# Patient Record
Sex: FEMALE | Race: OTHER | Employment: UNEMPLOYED | URBAN - METROPOLITAN AREA
[De-identification: names, ages, dates, MRNs, and addresses within clinical notes are randomized per-mention and may not be internally consistent; named-entity substitution may affect disease eponyms.]

---

## 2022-01-21 ENCOUNTER — OFFICE VISIT (OUTPATIENT)
Dept: FAMILY MEDICINE CLINIC | Age: 37
End: 2022-01-21

## 2022-01-21 VITALS
HEIGHT: 60 IN | BODY MASS INDEX: 35.53 KG/M2 | WEIGHT: 181 LBS | SYSTOLIC BLOOD PRESSURE: 107 MMHG | TEMPERATURE: 98 F | DIASTOLIC BLOOD PRESSURE: 73 MMHG | HEART RATE: 85 BPM | OXYGEN SATURATION: 99 %

## 2022-01-21 DIAGNOSIS — N92.6 MISSED MENSES: Primary | ICD-10-CM

## 2022-01-21 LAB
ANTIBODY SCREEN, EXTERNAL: NEGATIVE
BILIRUB UR QL STRIP: NEGATIVE
CHLAMYDIA, EXTERNAL: NEGATIVE
GLUCOSE UR-MCNC: NEGATIVE MG/DL
HBSAG, EXTERNAL: NEGATIVE
HCG URINE, QL. (POC): POSITIVE
HIV, EXTERNAL: NEGATIVE
KETONES P FAST UR STRIP-MCNC: NEGATIVE MG/DL
N. GONORRHEA, EXTERNAL: NEGATIVE
PH UR STRIP: 7 [PH] (ref 4.6–8)
PROT UR QL STRIP: NEGATIVE
RPR, EXTERNAL: NONREACTIVE
RUBELLA, EXTERNAL: NORMAL
SP GR UR STRIP: 1.02 (ref 1–1.03)
TYPE, ABO & RH, EXTERNAL: NORMAL
UA UROBILINOGEN AMB POC: NORMAL (ref 0.2–1)
URINALYSIS CLARITY POC: CLEAR
URINALYSIS COLOR POC: YELLOW
URINE BLOOD POC: NEGATIVE
URINE LEUKOCYTES POC: NEGATIVE
URINE NITRITES POC: NEGATIVE
VALID INTERNAL CONTROL?: YES

## 2022-01-21 PROCEDURE — 81003 URINALYSIS AUTO W/O SCOPE: CPT | Performed by: STUDENT IN AN ORGANIZED HEALTH CARE EDUCATION/TRAINING PROGRAM

## 2022-01-21 PROCEDURE — 0500F INITIAL PRENATAL CARE VISIT: CPT | Performed by: STUDENT IN AN ORGANIZED HEALTH CARE EDUCATION/TRAINING PROGRAM

## 2022-01-21 PROCEDURE — 81025 URINE PREGNANCY TEST: CPT | Performed by: STUDENT IN AN ORGANIZED HEALTH CARE EDUCATION/TRAINING PROGRAM

## 2022-01-21 NOTE — PROGRESS NOTES
Subjective:   Cecelia Sheppard is a 39 y.o. female who is being seen today for evaluation of pregnancy. LMP was on 2022. She has not receive any prenatal care and is currently not taking any PNV. Patient endorses some occasional headaches, but denies any symptoms such as dysuria, abdominal pain, chest pain, n/v and lower ext swelling. Gyn:   She is sexually active with and is not using contraception. Allergies- reviewed:   Not on File      Medications- reviewed:   Current Outpatient Medications   Medication Sig    acetaminophen (TYLENOL PO) Take  by mouth. No current facility-administered medications for this visit. Past Medical History- reviewed:  History reviewed. No pertinent past medical history. Past Surgical History- reviewed:   History reviewed. No pertinent surgical history. Social History- reviewed:  Social History     Socioeconomic History    Marital status: UNKNOWN     Spouse name: Not on file    Number of children: Not on file    Years of education: Not on file    Highest education level: Not on file   Occupational History    Not on file   Tobacco Use    Smoking status: Never Smoker    Smokeless tobacco: Never Used   Substance and Sexual Activity    Alcohol use: Yes    Drug use: Not on file    Sexual activity: Not on file   Other Topics Concern    Not on file   Social History Narrative    Not on file     Social Determinants of Health     Financial Resource Strain:     Difficulty of Paying Living Expenses: Not on file   Food Insecurity:     Worried About Running Out of Food in the Last Year: Not on file    Davi of Food in the Last Year: Not on file   Transportation Needs:     Lack of Transportation (Medical): Not on file    Lack of Transportation (Non-Medical):  Not on file   Physical Activity:     Days of Exercise per Week: Not on file    Minutes of Exercise per Session: Not on file   Stress:     Feeling of Stress : Not on file   Social Connections:     Frequency of Communication with Friends and Family: Not on file    Frequency of Social Gatherings with Friends and Family: Not on file    Attends Protestant Services: Not on file    Active Member of Clubs or Organizations: Not on file    Attends Club or Organization Meetings: Not on file    Marital Status: Not on file   Intimate Partner Violence:     Fear of Current or Ex-Partner: Not on file    Emotionally Abused: Not on file    Physically Abused: Not on file    Sexually Abused: Not on file   Housing Stability:     Unable to Pay for Housing in the Last Year: Not on file    Number of [place] in the Last Year: Not on file    Unstable Housing in the Last Year: Not on file         Immunizations- reviewed: There is no immunization history on file for this patient.     ROS:  General: No fevers or chills  GI: No abdominal pain, nausea, vomiting  : No vaginal bleeding      Objective:     Visit Vitals  /73 (BP 1 Location: Right upper arm, BP Patient Position: Sitting)   Pulse 85   Temp 98 °F (36.7 °C) (Oral)   Ht 5' 0.24\" (1.53 m)   Wt 181 lb (82.1 kg)   LMP 08/28/2021   SpO2 99%   BMI 35.07 kg/m²       Physical Exam:  GENERAL APPEARANCE: alert, well appearing, in no apparent distress, obese  HEAD: normocephalic, atraumatic  LUNGS: clear to auscultation, no wheezes, rales or rhonchi, symmetric air entry  HEART: regular rate and rhythm, no murmurs  ABDOMEN: FHT present: 150, fundal height: 22 cm  BACK: no CVA tenderness    Labs:  Urine pregnancy test   Recent Results (from the past 12 hour(s))   AMB POC URINE PREGNANCY TEST, VISUAL COLOR COMPARISON    Collection Time: 01/21/22 10:26 AM   Result Value Ref Range    VALID INTERNAL CONTROL POC Yes     HCG urine, Ql. (POC) Positive Negative   AMB POC URINALYSIS DIP STICK AUTO W/O MICRO    Collection Time: 01/21/22 10:26 AM   Result Value Ref Range    Color (UA POC) Yellow     Clarity (UA POC) Clear     Glucose (UA POC) Negative Negative    Bilirubin (UA POC) Negative Negative    Ketones (UA POC) Negative Negative    Specific gravity (UA POC) 1.020 1.001 - 1.035    Blood (UA POC) Negative Negative    pH (UA POC) 7.0 4.6 - 8.0    Protein (UA POC) Negative Negative    Urobilinogen (UA POC) 1 mg/dL 0.2 - 1    Nitrites (UA POC) Negative Negative    Leukocyte esterase (UA POC) Negative Negative         Assessment:     39year old  who presents for missed menses. Pregnancy at 20 and 5/7 weeks by LMP. SHIRLEY: 22. ICD-10-CM ICD-9-CM    1. Missed menses  N92.6 626.4 AMB POC URINE PREGNANCY TEST, VISUAL COLOR COMPARISON      AMB POC URINALYSIS DIP STICK AUTO W/O MICRO      CBC WITH AUTOMATED DIFF      HIV 1/2 AG/AB, 4TH GENERATION,W RFLX CONFIRM      TYPE & SCREEN      CULTURE, URINE      CHLAMYDIA/GC PCR      RUBELLA AB, IGG      HEMOGLOBIN FRACTIONATION      RPR      VZV AB, IGG      HEP B SURFACE AG      HEPATITIS C AB       Plan:   · Return to clinic in 7 days (21) for follow up on IOB. · Discussed will obtain PAP smear and 1 hr GTT  · Recommended Prenatal vitamins. · MFM scan schedule. · Will obtain IOB today, while patient is here. · Discussed avoiding medications other than Tylenol for pain. Recommended COVID and Flu vaccines. Recommended regular low-impact exercise and to watch weight. Recommended seat belt use. IUP: @ 20 wks5d per LMP  - IOB labs  - Will discuss genetic testing on next visit. - Needs pelvic exam and PAP smear    Late to care: At 20 weeks  - Encourage regular follow up. Will see in one week.   - Continue with PNV    Advance maternal age: Denies   - Consider starting ASA     Obesity:  - Discussed weight.  - Needs early 1 hr GTT      Orders Placed This Encounter    CULTURE, URINE     Standing Status:   Future     Standing Expiration Date:   2023    CHLAMYDIA/GC PCR     Standing Status:   Future     Standing Expiration Date:   2023     Order Specific Question:   Sample source     Answer: Urine [258]     Order Specific Question:   Specimen source     Answer:   Urine [258]    CBC WITH AUTOMATED DIFF     Standing Status:   Future     Standing Expiration Date:   1/22/2023    HIV 1/2 AG/AB, 4TH GENERATION,W RFLX CONFIRM     Standing Status:   Future     Standing Expiration Date:   1/21/2023    RUBELLA AB, IGG     Standing Status:   Future     Standing Expiration Date:   1/22/2023    HEMOGLOBIN FRACTIONATION     Standing Status:   Future     Standing Expiration Date:   1/21/2023    RPR     Standing Status:   Future     Standing Expiration Date:   1/22/2023    VZV AB, IGG     Standing Status:   Future     Standing Expiration Date:   1/22/2023    HEP B SURFACE AG     Standing Status:   Future     Standing Expiration Date:   1/21/2023    HEPATITIS C AB     Standing Status:   Future     Standing Expiration Date:   1/21/2023    AMB POC URINE PREGNANCY TEST, VISUAL COLOR COMPARISON    AMB POC URINALYSIS DIP STICK AUTO W/O MICRO    TYPE & SCREEN     ENTER SURGERY DATE IF FOR PRE-OP TESTING. Standing Status:   Future     Standing Expiration Date:   1/21/2023     Order Specific Question:   Has patient been transfused or pregnant in the last 3 mos. ? Answer:   Unknown    acetaminophen (TYLENOL PO)     Sig: Take  by mouth. I have discussed the diagnosis with the patient and the intended plan as seen in the above orders. The patient verbalized understanding of the treatment plan and agrees with the plan. The patient has received an after-visit summary and questions were answered concerning future plans. I have discussed medication side effects and warnings with the patient as well. Informed pt to return to the office or go to the ER if she experiences vaginal bleeding, vaginal discharge, leaking of fluid, pelvic cramping.       Lennie Palacios MD  Family Medicine Resident

## 2022-01-21 NOTE — PATIENT INSTRUCTIONS
[facility] consultas prenatales  Learning About Prenatal Visits  Instrucciones de cuidado     Las consultas prenatales regulares son muy importantes perry cualquier [facility]. Estas breves visitas al [employer] podrían parecer simples y rutinarias. Alanna pueden ayudarles a usted y mtz bebé a mantenerse sanos. Mtz médico está alerta a problemas que solo se pueden detectar si se los Lynsey Primus a usted y a mtz bebé en forma regular. Estas consultas [employer] dan a usted y a mtz médico tiempo para establecer hakeem buena relación. Muchas mujeres acuden a consultas prenatales con hakeem frecuencia de 4 a 6 semanas hasta la semana 28 del embarazo. [facility] consultas se [employer]. Con frecuencia, esto ocurre cada 2 o 3 semanas hasta la semana 36 del [facility]. Perry el último mes del [facility], es probable que acuda a abby a mtz médico cada semana. Podría tener un programa distinto si tiene un problema médico o es adolescente. En distintos momentos del embarazo, se le harán exámenes y Vendor. Algunos son [employer] [place]. Otros se hacen solamente cuando existen riesgos de que ocurra un problema. Todas las cosas que elen por mtz juan antonio Delcia Dauer a mtz bebé en desarrollo. Descanse cuando lo necesite. Aliméntese loretta, elsa abundante agua y elen ejercicio de Lizette regular. La atención de seguimiento es hakeem parte clave de mtz tratamiento y seguridad. Asegúrese de hacer y acudir a todas las citas, y llame a mtz médico si está teniendo problemas. También es hakeem buena idea saber los resultados de faith exámenes y mantener hakeem lista de los medicamentos que case. ¿Qué ocurre perry hakeem consulta prenatal?  · Le medirán la presión arterial y le harán también análisis de [place]. Es posible que [employer] billy análisis de Daniel stratton. Si necesita ir al baño mientras espera al médico, avísele a la enfermera. Marella Mt un recipiente para muestras con el fin de poder examinar mtz orina. · La pesarán y le medirán el abdomen.   · Mtz médico podría escuchar los latidos del corazón de mtz bebé con un estetoscopio especial.  · Perry el rayne trimestre, el médico le revisará el azúcar en la eliane (prueba de tolerancia a la glucosa) para detectar diabetes que puede aparecer perry el [place]. Esta diabetes se conoce rory diabetes gestacional y puede hacer daño al bebé. · Se le harán pruebas para detectar infecciones que podrían hacer daño a mtz bebé. Estas incluyen el estreptococo de huan B y hepatitis B.  · Mtz médico podría hacerle ecografías para detectar cualquier problema. Así también se comprobará la posición de mtz bebé. Culp [employer]ye [place] ondas de kieran para producir hakeem imagen de mtz bebé. · Es posible que le billy otras pruebas en cualquier momento del [place]. · Aproveche las consultas para conversar con mtz médico acerca de cualquier inquietud que tenga. ¿Cómo puede cuidarse en el hogar? · Descanse lo suficiente. · [employer] días, si mtz médico lo [place]. Si no ha hecho ejercicio en el pasado, comience poco a poco. Rocio muchas caminatas cortas todos los días. · Siga hakeem Aldona Shaan. Asegúrese de incluir en mtz dieta abundantes frijoles (habichuelas), arvejas (chícharos) y verduras de hojas verdes. · Rosana mucho líquido. Limite las bebidas con cafeína, rory el café, el té y las bebidas de cola. Si tiene hakeem enfermedad renal, cardíaca o hepática y tiene que restringir los líquidos, hable con mtz médico antes de aumentar la cantidad de líquido que makayla. · Evite las [employer], los vapores de pintura y sustancias tóxicas. No consuma alcohol, marihuana ni drogas ilegales. No fume, use cigarrillos electrónicos ni consuma tabaco. Si necesita ayuda para dejar de fumar, hable con mtz médico sobre programas y medicamentos para dejar de fumar. Estos pueden aumentar faith probabilidades de dejar de fumar para siempre.   · Revise todos los medicamentos que esté tomando con mtz Vijay Spray sea necesario cambiar algunos de faith medicamentos de rutina para proteger al bebé. No deje de agustin ni comience a agustin ningún medicamento sin hablar antes con mtz médico.  ¿Dónde puede encontrar más información en inglés? Tanja Wilson a http://www.gray.Ganos/  Juhi K815 en la búsqueda para aprender más acerca de \"Aprenda sobre las consultas prenatales. \"  Revisado: 16 junio, 2021               Versión del contenido: 13.0  © 1029-7007 Healthwise, Incorporated. Las instrucciones de cuidado fueron adaptadas bajo licencia por [employer] (which disclaims liability or warranty for this information). Si usted tiene Kenton Harned afección médica o sobre estas instrucciones, siempre pregunte a mtz profesional de juan antonio. Healthwise, Incorporated niega toda garantía o responsabilidad por mtz uso de esta información.

## 2022-01-21 NOTE — PROGRESS NOTES
Aleah Smith is a 39 y.o. female    Chief Complaint   Patient presents with   [address]     [facility]. She states thatshe is pregnant. LMP was 08/29/2021. She is 20 weeks and 5 days. G6, P5. She is not having vaginal bleeding. She state that she started about 2 weeks ago with tranpsarent discharge and itchiness. She is having fetal movement. She is not taking her prenatal vitamins. No contractions. No other concersn. 1. Have you been to the ER, urgent care clinic since your last visit? Hospitalized since your last visit? No  2. Have you seen or consulted any other health care providers outside of the [facility] since your last visit? Include any pap smears or colon screening. No      Visit Vitals  /73 (BP 1 Location: Right upper arm, BP Patient Position: Sitting)   Pulse 85   Temp 98 °F (36.7 °C) (Oral)   Ht 5' 0.24\" (1.53 m)   Wt 181 lb (82.1 kg)   SpO2 99%   BMI 35.07 kg/m²           Health Maintenance Due   Topic Date Due    Hepatitis C Screening  Never done    COVID-19 Vaccine (1) Never done    DTaP/Tdap/Td series (1 - Tdap) Never done    Cervical cancer screen  Never done    Flu Vaccine (1) Never done         Medication Reconciliation completed, changes noted.   Please  Update medication list.

## 2022-01-22 LAB
ABO + RH BLD: NORMAL
BACTERIA SPEC CULT: NORMAL
BLOOD BANK CMNT PATIENT-IMP: NORMAL
BLOOD GROUP ANTIBODIES SERPL: NORMAL
SERVICE CMNT-IMP: NORMAL
SPECIMEN EXP DATE BLD: NORMAL

## 2022-01-23 LAB
BASOPHILS # BLD: 0 K/UL (ref 0–0.1)
BASOPHILS NFR BLD: 0 % (ref 0–1)
DIFFERENTIAL METHOD BLD: NORMAL
EOSINOPHIL # BLD: 0.2 K/UL (ref 0–0.4)
EOSINOPHIL NFR BLD: 3 % (ref 0–7)
ERYTHROCYTE [DISTWIDTH] IN BLOOD BY AUTOMATED COUNT: 14.5 % (ref 11.5–14.5)
HBV SURFACE AG SER QL: <0.1 INDEX
HBV SURFACE AG SER QL: NEGATIVE
HCT VFR BLD AUTO: 36.8 % (ref 35–47)
HCV AB SERPL QL IA: NONREACTIVE
HGB BLD-MCNC: 11.5 G/DL (ref 11.5–16)
HIV 1+2 AB+HIV1 P24 AG SERPL QL IA: NONREACTIVE
HIV12 RESULT COMMENT, HHIVC: NORMAL
IMM GRANULOCYTES # BLD AUTO: 0 K/UL (ref 0–0.04)
IMM GRANULOCYTES NFR BLD AUTO: 0 % (ref 0–0.5)
LYMPHOCYTES # BLD: 2.7 K/UL (ref 0.8–3.5)
LYMPHOCYTES NFR BLD: 29 % (ref 12–49)
MCH RBC QN AUTO: 29.6 PG (ref 26–34)
MCHC RBC AUTO-ENTMCNC: 31.3 G/DL (ref 30–36.5)
MCV RBC AUTO: 94.6 FL (ref 80–99)
MONOCYTES # BLD: 0.6 K/UL (ref 0–1)
MONOCYTES NFR BLD: 6 % (ref 5–13)
NEUTS SEG # BLD: 5.9 K/UL (ref 1.8–8)
NEUTS SEG NFR BLD: 62 % (ref 32–75)
NRBC # BLD: 0 K/UL (ref 0–0.01)
NRBC BLD-RTO: 0 PER 100 WBC
PLATELET # BLD AUTO: 191 K/UL (ref 150–400)
PMV BLD AUTO: 12.9 FL (ref 8.9–12.9)
RBC # BLD AUTO: 3.89 M/UL (ref 3.8–5.2)
RPR SER QL: NONREACTIVE
RUBV IGG SER-IMP: REACTIVE
RUBV IGG SERPL IA-ACNC: 219.9 IU/ML
WBC # BLD AUTO: 9.5 K/UL (ref 3.6–11)

## 2022-01-24 LAB — VZV IGG SER IA-ACNC: 1057 INDEX

## 2022-01-25 LAB
C TRACH RRNA SPEC QL NAA+PROBE: NEGATIVE
HGB A MFR BLD: 97.5 % (ref 96.4–98.8)
HGB A2 MFR BLD COLUMN CHROM: 2.5 % (ref 1.8–3.2)
HGB F MFR BLD: 0 % (ref 0–2)
HGB FRACT BLD-IMP: NORMAL
HGB S MFR BLD: 0 %
N GONORRHOEA RRNA SPEC QL NAA+PROBE: NEGATIVE
SPECIMEN SOURCE: NORMAL

## 2022-01-28 NOTE — PROGRESS NOTES
Hgb: 11.5, Hgb fractionation normal, VZV immune, Hep C/Hep B/ HIV/ C/G neg, Rubella reactive, Ucx neg

## 2022-02-06 NOTE — PROGRESS NOTES
Initial OB Visit     3 [facility] interpretor used due to language barrier  Subjective:   Ayesha Bell is a 39 y.o. [id number]  At 58tnw4a with SHIRLEY of 22 based on LMP, who is being seen today for her first initial obstetrical visit. This is not a planned pregnancy. She is at 23w4d by LMP . History of Depression in pregnancy or post partum? No  History of GDM or DM? No  History of GHTN or HTN? No  History of pre-eclampsia? No  History of thyroid disorder? No, but her sister has a thyroid disorder (not sure what)   History of ? No  History of PTD? Yes, 36w  History of ? No  History of Genetic disorders? No  History of STD's? No  History of abnormal PAP? No  Taking prenatal vitamins? Yes    Allergies- reviewed:   No Known Allergies    Medications- reviewed:   Current Outpatient Medications   Medication Sig    PNV Comb #2-Iron-FA-Omega 3 29-1-400 mg cmpk Take  by mouth.  aspirin delayed-release 81 mg tablet Take 1 Tablet by mouth daily.  acetaminophen (TYLENOL PO) Take  by mouth. No current facility-administered medications for this visit. Past Medical History- reviewed:  No past medical history on file. Past Surgical History- reviewed:   No past surgical history on file. Social History- reviewed:  Social History     Socioeconomic History    Marital status: UNKNOWN     Spouse name: Not on file    Number of children: Not on file    Years of education: Not on file    Highest education level: Not on file   Occupational History    Not on file   Tobacco Use    Smoking status: Never Smoker    Smokeless tobacco: Never Used   Substance and Sexual Activity    Alcohol use:  Yes    Drug use: Not on file    Sexual activity: Not on file   Other Topics Concern    Not on file   Social History Narrative    Not on file     Social Determinants of Health     Financial Resource Strain:     Difficulty of Paying Living Expenses: Not on file   Food Insecurity:     Worried About Running Out of Food in the Last Year: Not on file    Ran Out of Food in the Last Year: Not on file   Transportation Needs:     Lack of Transportation (Medical): Not on file    Lack of Transportation (Non-Medical): Not on file   Physical Activity:     Days of Exercise per Week: Not on file    Minutes of Exercise per Session: Not on file   Stress:     Feeling of Stress : Not on file   Social Connections:     Frequency of Communication with Friends and Family: Not on file    Frequency of Social Gatherings with Friends and Family: Not on file    Attends Uatsdin Services: Not on file    Active Member of [address] [place] [address] [employer] or Organizations: Not on file    Attends Club or Organization Meetings: Not on file    Marital Status: Not on file   Intimate Partner Violence:     Fear of Current or Ex-Partner: Not on file    Emotionally Abused: Not on file    Physically Abused: Not on file    Sexually Abused: Not on file   Housing Stability:     Unable to Pay for Housing in the Last Year: Not on file    Number of [place] in the Last Year: Not on file    Unstable Housing in the Last Year: Not on file         Objective:     Visit Vitals  /66   Pulse 83   Temp 97.1 °F (36.2 °C) (Temporal)   Resp 17   Ht 5' 0.25\" (1.53 m)   Wt 182 lb (82.6 kg)   LMP 08/28/2021   SpO2 100%   BMI 35.25 kg/m²       See physical exam on flowsheet  Pelvic exam chaperoned by Mikey Heredia RN    Labs:    No results found for this or any previous visit (from the past 12 hour(s)). Assessment and Plan:         ICD-10-CM ICD-9-CM    1. Initial obstetric visit in second trimester  O09.32 V23.7 GLUCOSE, GESTATIONAL 1 HR TOLERANCE      HEMOGLOBIN A1C WITH EAG      PAP IG, APTIMA HPV AND RFX 16/18,45 ([id number])      TSH 3RD GENERATION      TSH 3RD GENERATION      HEMOGLOBIN A1C WITH EAG      GLUCOSE, GESTATIONAL 1 HR TOLERANCE      PAP IG, APTIMA HPV AND RFX 16/18,45 ([id number])   2. 23 weeks gestation of pregnancy  Z3A.23 V22.2    3. Obesity in pregnancy  O99.210 649.10 GLUCOSE, GESTATIONAL 1 HR TOLERANCE      HEMOGLOBIN A1C WITH EAG      HEMOGLOBIN A1C WITH EAG      GLUCOSE, GESTATIONAL 1 HR TOLERANCE   4. Multigravida of advanced maternal age in second trimester  O09.522 65.56        39 y.o. [id number]  at 15wfg5a with SHIRLEY 06/04/22, here for initial OB visit       IUP: @ 02suo8v  per LMP   - IOB labs collected last visit: B+, Ab neg, Hgb fractionation normal, HIV/G/C/RPR/Hep B/Hep C neg, Rub/VZV immune, Ucx neg  - US (2/7) @ 85pbr2h: Normal anatomy,  g 44th%, F/U suboptimal views in 4 weeks  - Pelvic exam and PAP smear today  - Continue taking prenatal vitamins daily  - Discussed optional genetic screening and educational material provided; pt unsure at this time      Late to care: At 20 weeks  - Encourage regular follow up. Next appt in 4 weeks (3/9/22 at 10:20am)  - Continue with PNV     Advance maternal age:   - Starting ASA 81mg today      Obesity in pregnancy: Body mass index is 35.25 kg/m². - Early 1 hr GTT today, hgb A1c  - Discussed recommended weight gain (prepreg BMI <19.8 28-40lb, 19.8-26 25-35lb, 26-29 15-25lb, >29 11-20lb)  - Recommend exercise in pregnancy at least 3 or more times weekly, mild to moderate intensity. Avoid activities that cause abdominal trauma. - Discussed appropriate diet during pregnancy, foods to avoid and stressed importance of hydration       · Follow up in 4 weeks    Orders Placed This Encounter    GLUCOSE, GESTATIONAL 1 HR TOLERANCE     Standing Status:   Future     Number of Occurrences:   1     Standing Expiration Date:   2/7/2023    HEMOGLOBIN A1C WITH EAG     Standing Status:   Future     Number of Occurrences:   1     Standing Expiration Date:   2/8/2023    TSH 3RD GENERATION     Standing Status:   Future     Number of Occurrences:   1     Standing Expiration Date:   2/9/2023    PNV Comb #2-Iron-FA-Omega 3 29-1-400 mg cmpk     Sig: Take  by mouth.     aspirin delayed-release 81 mg tablet     Sig: Take 1 Tablet by mouth daily. Dispense:  90 Tablet     Refill:  1    PAP IG, APTIMA HPV AND RFX 16/18,45 ([id number])     Standing Status:   Future     Number of Occurrences:   1     Standing Expiration Date:   2/8/2023     Order Specific Question:   Pap Source? Answer:   Cervical     Order Specific Question:   Total Hysterectomy? Answer:   No     Order Specific Question:   Supracervical Hysterectomy? Answer:   No     Order Specific Question:   Post Menopausal?     Answer:   No     Order Specific Question:   Hormone Therapy? Answer:   No     Order Specific Question:   IUD? Answer:   No     Order Specific Question:   Abnormal Bleeding? Answer:   No     Order Specific Question:   Pregnant     Answer:   Yes     Order Specific Question:   Post Partum? Answer:   No         I have discussed the diagnosis with the patient and the intended plan as seen in the above orders. The patient has received an after-visit summary and questions were answered concerning future plans. I have discussed medication side effects and warnings with the patient as well. Informed pt to return to the office or go to the ER if she experiences vaginal bleeding, vaginal discharge, leaking of fluid, pelvic cramping.       Pt seen and discussed with Kalin (attending physician)    David Stewart MD  Family Medicine Resident

## 2022-02-07 ENCOUNTER — HOSPITAL ENCOUNTER (OUTPATIENT)
Dept: PERINATAL CARE | Age: 37
Discharge: HOME | End: 2022-02-07
Attending: OBSTETRICS & GYNECOLOGY
Payer: MEDICAID

## 2022-02-07 PROCEDURE — 76811 OB US DETAILED SNGL FETUS: CPT | Performed by: OBSTETRICS & GYNECOLOGY

## 2022-02-09 ENCOUNTER — ROUTINE PRENATAL (OUTPATIENT)
Dept: FAMILY MEDICINE CLINIC | Age: 37
End: 2022-02-09
Payer: MEDICAID

## 2022-02-09 ENCOUNTER — HOSPITAL ENCOUNTER (OUTPATIENT)
Dept: LAB | Age: 37
Discharge: HOME | End: 2022-02-09
Payer: MEDICAID

## 2022-02-09 VITALS
HEIGHT: 60 IN | RESPIRATION RATE: 17 BRPM | DIASTOLIC BLOOD PRESSURE: 66 MMHG | WEIGHT: 182 LBS | TEMPERATURE: 97.1 F | OXYGEN SATURATION: 100 % | BODY MASS INDEX: 35.73 KG/M2 | HEART RATE: 83 BPM | SYSTOLIC BLOOD PRESSURE: 110 MMHG

## 2022-02-09 DIAGNOSIS — Z3A.23 23 WEEKS GESTATION OF PREGNANCY: ICD-10-CM

## 2022-02-09 DIAGNOSIS — O09.32 INITIAL OBSTETRIC VISIT IN SECOND TRIMESTER: Primary | ICD-10-CM

## 2022-02-09 DIAGNOSIS — O99.210 OBESITY IN PREGNANCY: ICD-10-CM

## 2022-02-09 DIAGNOSIS — O09.522 MULTIGRAVIDA OF ADVANCED MATERNAL AGE IN SECOND TRIMESTER: ICD-10-CM

## 2022-02-09 LAB
EST. AVERAGE GLUCOSE BLD GHB EST-MCNC: 114 MG/DL
GLUCOSE 1H P 100 G GLC PO SERPL-MCNC: 140 MG/DL (ref 65–140)
HBA1C MFR BLD: 5.6 % (ref 4–5.6)
TSH SERPL DL<=0.05 MIU/L-ACNC: 1.62 UIU/ML (ref 0.36–3.74)

## 2022-02-09 PROCEDURE — 88175 CYTOPATH C/V AUTO FLUID REDO: CPT

## 2022-02-09 PROCEDURE — 87624 HPV HI-RISK TYP POOLED RSLT: CPT

## 2022-02-09 PROCEDURE — 0500F INITIAL PRENATAL CARE VISIT: CPT | Performed by: STUDENT IN AN ORGANIZED HEALTH CARE EDUCATION/TRAINING PROGRAM

## 2022-02-09 RX ORDER — ASPIRIN 81 MG/1
81 TABLET ORAL DAILY
Qty: 90 TABLET | Refills: 1 | Status: SHIPPED | OUTPATIENT
Start: 2022-02-09

## 2022-02-09 NOTE — PROGRESS NOTES
I reviewed with the resident the medical history and the resident's findings on the physical examination. I discussed with the resident the patient's diagnosis and concur with the plan. [facility] @ 23w4d  1. IUP: RH pos, is undecided if she wants NIPT but info given   2. Late to care  3. Family hx thyroid disease: check TSH   4.   AMA+obesity: early GTT+A1C today, ASA

## 2022-02-09 NOTE — PROGRESS NOTES
Chief Complaint   Patient presents with    Initial Prenatal Visit     .  23w 4d today. No c/o.     1. Have you been to the ER, urgent care clinic since your last visit? Hospitalized since your last visit? No    2. Have you seen or consulted any other health care providers outside of the [address] [place] [address] since your last visit? Include any pap smears or colon screening.  No

## 2022-03-07 ENCOUNTER — HOSPITAL ENCOUNTER (OUTPATIENT)
Dept: PERINATAL CARE | Age: 37
Discharge: HOME | End: 2022-03-07
Attending: OBSTETRICS & GYNECOLOGY
Payer: MEDICAID

## 2022-03-07 PROCEDURE — 76816 OB US FOLLOW-UP PER FETUS: CPT | Performed by: OBSTETRICS & GYNECOLOGY

## 2022-03-09 ENCOUNTER — ROUTINE PRENATAL (OUTPATIENT)
Dept: FAMILY MEDICINE CLINIC | Age: 37
End: 2022-03-09
Payer: MEDICAID

## 2022-03-09 VITALS
HEART RATE: 86 BPM | TEMPERATURE: 97.5 F | DIASTOLIC BLOOD PRESSURE: 78 MMHG | WEIGHT: 183 LBS | OXYGEN SATURATION: 99 % | BODY MASS INDEX: 35.44 KG/M2 | RESPIRATION RATE: 16 BRPM | SYSTOLIC BLOOD PRESSURE: 111 MMHG

## 2022-03-09 DIAGNOSIS — Z3A.27 27 WEEKS GESTATION OF PREGNANCY: Primary | ICD-10-CM

## 2022-03-09 DIAGNOSIS — O09.522 MULTIGRAVIDA OF ADVANCED MATERNAL AGE IN SECOND TRIMESTER: ICD-10-CM

## 2022-03-09 DIAGNOSIS — O99.210 OBESITY IN PREGNANCY: ICD-10-CM

## 2022-03-09 PROCEDURE — 0502F SUBSEQUENT PRENATAL CARE: CPT | Performed by: STUDENT IN AN ORGANIZED HEALTH CARE EDUCATION/TRAINING PROGRAM

## 2022-03-09 PROCEDURE — 90686 IIV4 VACC NO PRSV 0.5 ML IM: CPT | Performed by: STUDENT IN AN ORGANIZED HEALTH CARE EDUCATION/TRAINING PROGRAM

## 2022-03-09 PROCEDURE — 90715 TDAP VACCINE 7 YRS/> IM: CPT | Performed by: STUDENT IN AN ORGANIZED HEALTH CARE EDUCATION/TRAINING PROGRAM

## 2022-03-09 NOTE — PROGRESS NOTES
I reviewed with the resident the medical history and the resident's findings on the physical examination. I discussed with the resident the patient's diagnosis and concur with the plan. 35yo  @ 27w4d  1. IUP: RH pos, NIPT at next appt with repeat GTT    2. Late to care  3. Family hx thyroid disease: check TSH   4.   AMA+obesity: early GTT+A1C today, ASA

## 2022-03-09 NOTE — PROGRESS NOTES
Tania Jenkins is a 39 y.o. female    Chief Complaint   Patient presents with    Routine Prenatal Visit     27w 4d       LOF: no  Vaginal bleeding: no  Fetal movement (after 20 weeks): yes  Contractions: yes - sometimes, not every day  Dysuria: no   Headaches, blurred vision, RUQ pain: no  Taking prenatal vitamins: yes    1. Have you been to the ER, urgent care clinic since your last visit? Hospitalized since your last visit? No    2. Have you seen or consulted any other health care providers outside of the [address] [place] [address] since your last visit? Include any pap smears or colon screening. No      Visit Vitals  /78   Pulse 86   Temp 97.5 °F (36.4 °C) (Temporal)   Resp 16   Wt 183 lb (83 kg)   SpO2 99%   BMI 35.44 kg/m²           Health Maintenance Due   Topic Date Due    Depression Screen  Never done    COVID-19 Vaccine (1) Never done         Medication Reconciliation completed, changes noted.   Please Update medication list.

## 2022-03-09 NOTE — PROGRESS NOTES
Return OB Visit       Subjective:   Jamila Gonzalez 39 y.o. Amador Lamb   SHIRLEY: 2022, by Last Menstrual Period  GA:  27w4d. LOF: no  Vaginal bleeding: no  Fetal movement (after 20 weeks): yes  Contractions: no    Pt denies fever, chills, HA, vision disturbances, RUQ pain, chest pain, SOB, N/V/D, constipation, urinary problems, foul smelling vaginal discharge, LE Edema worse than usual.     OB History    Para Term  AB Living   6 5 4 1   5   SAB IAB Ectopic Molar Multiple Live Births             5      # Outcome Date GA Lbr Osvaldo/2nd Weight Sex Delivery Anes PTL Lv   6 Current            5 Term  38w0d  6 lb 8 oz (2.948 kg) M  None N CHERELLE   4 Term  38w0d   F Vag-Spont None N CHERELLE   3 Term  38w0d  6 lb (2.722 kg) F Vag-Spont None N CHERELLE   2 Term  38w0d  8 lb (3.629 kg) M Vag-Spont None N CHERELLE   1   36w0d  6 lb 8.1 oz (2.95 kg) M Vag-Spont None Y CHERELLE       Allergies- reviewed:   No Known Allergies  Medications- reviewed:   Current Outpatient Medications   Medication Sig    PNV Comb #2-Iron-FA-Omega 3 29-1-400 mg cmpk Take  by mouth.  aspirin delayed-release 81 mg tablet Take 1 Tablet by mouth daily.  acetaminophen (TYLENOL PO) Take  by mouth. No current facility-administered medications for this visit. Past Medical History- reviewed:  No past medical history on file. Past Surgical History- reviewed:   No past surgical history on file. Social History- reviewed:  Social History     Socioeconomic History    Marital status: UNKNOWN     Spouse name: Not on file    Number of children: Not on file    Years of education: Not on file    Highest education level: Not on file   Occupational History    Not on file   Tobacco Use    Smoking status: Never Smoker    Smokeless tobacco: Never Used   Substance and Sexual Activity    Alcohol use:  Yes    Drug use: Not on file    Sexual activity: Not on file   Other Topics Concern    Not on file   Social History Narrative    Not on file     Social Determinants of Health     Financial Resource Strain:     Difficulty of Paying Living Expenses: Not on file   Food Insecurity:     Worried About Running Out of Food in the Last Year: Not on file    Davi of Food in the Last Year: Not on file   Transportation Needs:     Lack of Transportation (Medical): Not on file    Lack of Transportation (Non-Medical): Not on file   Physical Activity:     Days of Exercise per Week: Not on file    Minutes of Exercise per Session: Not on file   Stress:     Feeling of Stress : Not on file   Social Connections:     Frequency of Communication with Friends and Family: Not on file    Frequency of Social Gatherings with Friends and Family: Not on file    Attends Bahai Services: Not on file    Active Member of [address] [place] [address] [employer] or Organizations: Not on file    Attends Club or Organization Meetings: Not on file    Marital Status: Not on file   Intimate Partner Violence:     Fear of Current or Ex-Partner: Not on file    Emotionally Abused: Not on file    Physically Abused: Not on file    Sexually Abused: Not on file   Housing Stability:     Unable to Pay for Housing in the Last Year: Not on file    Number of [place] in the Last Year: Not on file    Unstable Housing in the Last Year: Not on file     Immunizations- reviewed: There is no immunization history for the selected administration types on file for this patient. Flu vaccine Today  Tdap Today    Objective:     Visit Vitals  /78   Pulse 86   Temp 97.5 °F (36.4 °C) (Temporal)   Resp 16   Wt 183 lb (83 kg)   LMP 08/28/2021   SpO2 99%   BMI 35.44 kg/m²     Physical Exam:  GENERAL APPEARANCE: alert, well appearing, in no apparent distress  ABDOMEN: gravid, Fundal height 27 FHT present at 145 bpm  PSYCH: normal mood and affect     Labs  No results found for this or any previous visit (from the past 12 hour(s)).     Assessment       ICD-10-CM ICD-9-CM    1. 27 weeks gestation of pregnancy Z3A.27 V22.2 INFLUENZA VIRUS VAC QUAD,SPLIT,PRESV FREE SYRINGE IM      TETANUS, DIPHTHERIA TOXOIDS AND ACELLULAR PERTUSSIS VACCINE (TDAP), IN INDIVIDS. >=7, IM      PA IMMUNIZ ADMIN,1 SINGLE/COMB VAC/TOXOID      PA IMMUNIZ,ADMIN,EACH ADDL   2. Multigravida of advanced maternal age in second trimester  O09.522 65.56    3. Obesity in pregnancy  O99.210 649.10        Plan   39 y.o.  27w4d SHIRLEY 2022, by Last Menstrual Period here for return OB visit        IUP: @ 06mdr1w  per LMP   - IOB labs collected last visit: B+, Ab neg, Hgb fractionation normal, HIV/G/C/RPR/Hep B/Hep C neg, Rub/VZV immune, Ucx neg, Pap NILM  - Flu and Tdap today  - U/S () @ 80hlv9x: Normal anatomy,  g 44th%,   - U/S (3/7) @ 65vhq8c: Normal anatomy, EFW 999g 47th%, suboptimal anatomy from last time well visualized  - Continue taking prenatal vitamins daily  - Discussed optional genetic screening: will draw at next visit with CBC and 1hr GTT     Late to care:  At 20 weeks  - Encourage regular follow up. Next appt in 4 weeks  - Continue with PNV     Advance maternal age:   - Continue ASA81     Obesity in pregnancy: Body mass index is 35.25 kg/m². - Early 1 hr GTT wnl, early hgb A1c 5.6, repeat testing at 24-28wk  - 1hr GTT at next visit, obtain third tri CBC at that time as well  - Discussed recommended weight gain (prepreg BMI <19.8 28-40lb, 19.8-26 25-35lb, 26-29 15-25lb, >29 11-20lb)  - Recommend exercise in pregnancy at least 3 or more times weekly, mild to moderate intensity. Avoid activities that cause abdominal trauma. - Discussed appropriate diet during pregnancy, foods to avoid and stressed importance of hydration     PREGNANCY CONCERNS    BIRTH PLAN  · Continuity Provider: none  · Pain mgmt. in labor: TBD  · Feeding plan: TBD  · Circ if male: TBD  · Social: Denies Tobacco, EtoH or illict drugs.       Orders Placed This Encounter    PA IMMUNIZ ADMIN,1 SINGLE/COMB VAC/TOXOID    [place] [address] [address] Tessie Zaldivar INFLUENZA VIRUS VAC QUAD,SPLIT,PRESV FREE SYRINGE IM (Flulaval, Fluzone, Fluarix) ([id number])    TETANUS, DIPHTHERIA TOXOIDS AND ACELLULAR PERTUSSIS VACCINE (TDAP), IN INDIVIDS. >=7, IM     Labor precautions discussed, including: Regular painful contractions, lasting for greater than one hour, taking your breath away; any vaginal bleeding; any leakage of fluid; or absent or decreased fetal movement. Call M.D. on call if any of these symptoms or signs occur. I have discussed the diagnosis with the patient and the intended plan as seen in the above orders. The patient has received an after-visit summary and questions were answered concerning future plans. I have discussed medication side effects and warnings with the patient as well. Informed pt to return to the office or go to the ER if she experiences vaginal bleeding, vaginal discharge, leaking of fluid, pelvic cramping.     Pt seen and discussed with Dr. Neto Mckeon (attending physician)    Samantha Lemus MD  Family Medicine Resident

## 2022-03-23 ENCOUNTER — ROUTINE PRENATAL (OUTPATIENT)
Dept: FAMILY MEDICINE CLINIC | Age: 37
End: 2022-03-23
Payer: MEDICAID

## 2022-03-23 VITALS
OXYGEN SATURATION: 99 % | BODY MASS INDEX: 35.89 KG/M2 | TEMPERATURE: 98.2 F | SYSTOLIC BLOOD PRESSURE: 107 MMHG | HEIGHT: 60 IN | DIASTOLIC BLOOD PRESSURE: 76 MMHG | WEIGHT: 182.8 LBS | RESPIRATION RATE: 16 BRPM | HEART RATE: 92 BPM

## 2022-03-23 DIAGNOSIS — R73.09 ABNORMAL GLUCOSE TOLERANCE TEST (GTT): ICD-10-CM

## 2022-03-23 DIAGNOSIS — Z34.93 PRENATAL CARE IN THIRD TRIMESTER: Primary | ICD-10-CM

## 2022-03-23 PROBLEM — Z83.49 FAMILY HISTORY OF THYROID DISEASE: Status: ACTIVE | Noted: 2022-03-23

## 2022-03-23 PROBLEM — Z34.80 SUPERVISION OF OTHER NORMAL PREGNANCY: Status: ACTIVE | Noted: 2022-03-23

## 2022-03-23 PROCEDURE — 0502F SUBSEQUENT PRENATAL CARE: CPT | Performed by: STUDENT IN AN ORGANIZED HEALTH CARE EDUCATION/TRAINING PROGRAM

## 2022-03-23 NOTE — PROGRESS NOTES
I reviewed with the resident the medical history and the resident's findings on the physical examination. I discussed with the resident the patient's diagnosis and concur with the plan. 44yo  at 29w4d here for return OB. 1. IUP: RH pos, NIPT today as well as GTT and CBC. Reviewed PNL U/S   2. Late to care  3. Failed early 1-hr GTT: repeat 1-hr GTT today, should have been 3-hr but not fasting   4. Family hx thyroid disease: TSH wnl  5.  AMA+obesity: continue daily ASA

## 2022-03-23 NOTE — PROGRESS NOTES
Return OB Visit     OB History    Para Term  AB Living   6 5 4 1   5   SAB IAB Ectopic Molar Multiple Live Births             5      # Outcome Date GA Lbr Osvaldo/2nd Weight Sex Delivery Anes PTL Lv   6 Current            5 Term  38w0d  6 lb 8 oz (2.948 kg) M  None N CHERELLE   4 Term  38w0d   F Vag-Spont None N CHERELLE   3 Term  38w0d  6 lb (2.722 kg) F Vag-Spont None N CHERELLE   2 Term  38w0d  8 lb (3.629 kg) M Vag-Spont None N CHERELLE   1   36w0d  6 lb 8.1 oz (2.95 kg) M Vag-Spont None Y CHERELLE   Stratus interpretor: [id number]  Subjective:   Kady Robles is a 39 y.o.  at 29w4d by LMP. Estimated Date of Delivery: 22. LOF: No  Vaginal bleeding: No  Fetal movement (after 20 weeks): Yes   Contractions: Irregular, infrequent  Urinary complaints?: Some urinary frequency, no dysuria. Headaches, blurred vision, RUQ pain: No  Taking prenatal vitamins: Yes      Allergies   No Known Allergies  Medications:   Current Outpatient Medications   Medication Sig    PNV Comb #2-Iron-FA-Omega 3 29-1-400 mg cmpk Take  by mouth.  aspirin delayed-release 81 mg tablet Take 1 Tablet by mouth daily.  acetaminophen (TYLENOL PO) Take  by mouth. (Patient not taking: Reported on 3/23/2022)     No current facility-administered medications for this visit. Past Medical History:  No past medical history on file. Past Surgical History:   No past surgical history on file. Social History:  Social History     Tobacco Use    Smoking status: Never Smoker    Smokeless tobacco: Never Used   Substance Use Topics    Alcohol use: Yes    Drug use: Not on file     Immunizations:   Immunization History   Administered Date(s) Administered    Influenza Vaccine Brainient) PF (>6 Mo Flulaval, Fluarix, and >3 Yrs [address] [place] [address]) 2022    Tdap 2022     S/p Flu and Tdap.   Objective:   Vital Signs  Visit Vitals  /76 (BP 1 Location: Left upper arm, BP Patient Position: Sitting, BP Cuff Size: Large adult)   Pulse 92   Temp 98.2 °F (36.8 °C) (Temporal)   Resp 16   Ht 5' 0.25\" (1.53 m)   Wt 182 lb 12.8 oz (82.9 kg)   LMP 2021   SpO2 99%   BMI 35.41 kg/m²       Physical Exam  See Prenatal Flowsheet, chaperoned by Carmen Brown, Licensed Nurse          Assessment   Adan Oliveira is a 39 y.o.  at 29w4d by LMP here for a return OB visit. Estimated Date of Delivery: 22. Plan     IUP: B+, Ab neg, Hgb fractionation normal, HIV/G/C/RPR/Hep B/Hep C neg, Rub/VZV immune, Ucx neg, Pap NILM.  - S/p Flu and Tdap  - U/S () @ 23w0d: Normal anatomy, MARA 174 g 44th%,   - U/S (3/7) @ 27w2d: Normal anatomy,  g 47th%, suboptimal anatomy from last time well visualized. - Continue taking prenatal vitamins daily  - A1c 5.6, 1 hr , repeat 1 hr GTT obtained (should have been 3 hr, but not fasting)  - Discussed optional genetic screening at prior visit: will draw today. - Obtain CBC    Urinary frequency:  - Will obtain UA w/ microscopic    Late to care:  Pt presented at 20 weeks. - Follow-up in 2 weeks  - Continue with PNV    Advance maternal age:   - Continue ASA 81 mg daily     Obesity in pregnancy:  - Early 1 hr GTT 140, A1c 5.6  - Recommend exercise in pregnancy at least 3 or more times weekly, mild to moderate intensity.  Avoid activities that cause abdominal trauma.    - Discussed appropriate diet during pregnancy, foods to avoid and stressed importance of hydration     Family hx of thyroid disease: TSH wnl. · Other  · Continuity Provider: None  · Pain mgmt. in labor: TBD  · Feeding: TBD  · Circ: TBD  · Social: TBD    Future Appointments   Date Time Provider Gurmeet Duque   2022  9:40 AM Linda Gagnon, DO [facility] BS AMB       Labor precautions discussed, including: Regular painful contractions, lasting for greater than one hour, taking your breath away; any vaginal bleeding; any leakage of fluid; or absent or decreased fetal movement.  Call M.D. on call if any of these symptoms or signs occur. I have discussed the diagnosis with the patient and the intended plan as seen in the above orders. The patient has received an after-visit summary and questions were answered concerning future plans. I have discussed medication side effects and warnings with the patient as well. Informed pt to return to the office or go to the ER if she experiences vaginal bleeding, vaginal discharge, leaking of fluid, pelvic cramping.     Patient discussed with Dr. Delta Arroyo (Attending Physician)    Justnia Briceño MD  Family Medicine Resident

## 2022-03-23 NOTE — PATIENT INSTRUCTIONS
Semanas 26 a 30 de day embarazo: Instrucciones de cuidado  Weeks 26 to 30 of Your Pregnancy: Care Instructions  Instrucciones de cuidado     Ahora usted se encuentra en el último trimestre del [place]. Day bebé está creciendo rápidamente. Y es probable que sienta que day bebé se mueve con más frecuencia. Es posible que day médico le diga que cuente la cantidad de patadas que da day bebé. La espalda puede dolerle mientras day cuerpo se acostumbra al tamaño y a la longitud de day bebé. Si todavía no le stein aplicado la vacuna Tdap (tétanos, difteria y tos [place]) perry fernando [place], hable con day médico acerca de aplicársela. Ghislaine Liam a proteger a day recién nacido contra la infección por tos ferina. Perry fernando tiempo, es importante que se cuide y preste atención a lo que day cuerpo necesita. Si tiene [employer], busque formas de estar con day arsalan que satisfagan day nivel de comodidad y deseo. Utilice las recomendaciones proporcionadas en esta hoja de cuidados para encontrar day propia manera de vivir la sexualidad. La atención de seguimiento es hakeem parte clave de day tratamiento y seguridad. Asegúrese de hacer y acudir a todas las citas, y llame a day médico si está teniendo problemas. También es hakeem buena idea saber los resultados de faith exámenes y mantener hakeem lista de los medicamentos que case. ¿Cómo puede cuidarse en el hogar? Leonard day trabajo con calma  · Tómese descansos frecuentes. Si es posible, deje de trabajar cuando esté cansada y descanse perry la hora del almuerzo. · Vaya al baño cada 2 horas. · Cambie de posición con frecuencia. Si está sentada perry mucho tiempo, póngase de pie y camine. · Si está toledo perry mucho tiempo, apoye un pie sobre un banco bajo, flexionando la rodilla. Después de estar toledo perry mucho tiempo, siéntese con los pies elevados.   · Evite las [place], las sustancias químicas y el humo del tabaco.  Viva day sexualidad a day manera  · [employer] sexuales perry el [employer], a menos que mtz médico le diga que no. · Podría tener un gran deseo sexual o estar completamente desinteresada. · El abdomen cada vez más perez podría hacer difícil encontrar hakeem buena posición perry el coito. Experimente y explore. · Cuando mtz arsalan le toque los senos, podría tener contracciones en [place]. · Un masaje en la espalda podría aliviar el dolor de espalda o los cólicos que a veces se sienten después del [place]. Aprenda sobre el trabajo de parto prematuro  · Esté alerta a las señales del trabajo de parto prematuro. Es posible que esté comenzando el [place] de parto si:  ? Tiene cólicos similares a los del período menstrual, con o sin náuseas. ? Tiene aproximadamente 4 contracciones o más en 20 minutos, o alrededor de 8 o más en 1 hora, incluso después de ed tomado un vaso de agua y estar en reposo. ? Tiene un dolor sordo (leve demetra continuo) en la riley baja de la espalda que no desaparece cuando cambia de posición. ? Siente dolor o presión en la pelvis que aparece y desaparece con determinada regularidad. ? Tiene espasmos intestinales o síntomas similares a los de la gripe, con o sin diarrea. ? Nota un aumento o un cambio en el flujo vaginal. El flujo podría ser Cathy Ovens, tener consistencia mucosa, ser Sheria Chute rastros de [place]. ? Se le rompe la mert. · Si ynes que ha comenzado un trabajo de parto prematuro:  ? Rosana 2 o 3 vasos de agua o jugo. No beber suficientes líquidos puede provocar contracciones. ? Detenga lo que esté haciendo, y vacíe la vejiga. Luego, acuéstese sobre el lado alden perry al menos 1 hora. ? Mientras descansa de jeanmarie, ubique mtz esternón. Coloque los dedos en el punto blando courtney debajo de él. Mueva los dedos hacia abajo en dirección al ombligo para encontrar la parte superior del [place]. Compruebe si está tenso. ? Las contracciones pueden ser débiles o intensas.  Registre faith contracciones perry Junita Likes hora. Petros Corydon contracción desde el comienzo de hakeem hasta el comienzo de [place]. ? Howard Dominga contracciones can que no ocurren con la regularidad de un patrón reciben el nombre de contracciones de [place]. Estas son \"contracciones de práctica\", demetra no indican el inicio del [place] de [place]. Por lo general, se detienen si cambia de [place]. ? Si tiene contracciones regulares, llame a mtz médico.  ¿Dónde puede encontrar más información en inglés? Mira Downey a http://www.machado.com/  Teresa Benedict P271 en la búsqueda para aprender más acerca de \"Semanas 26 a 30 de mtz embarazo: Instrucciones de cuidado. \"  Revisado: 16 junio, 2021               Versión del contenido: 13.2  © 1853-4735 Healthwise, Incorporated. Las instrucciones de cuidado fueron adaptadas bajo licencia por [employer] (which disclaims liability or warranty for this information). Si usted tiene [place] afección médica o sobre estas instrucciones, siempre pregunte a mtz profesional de juan antonio. [employer], [employer] niega toda garantía o responsabilidad por mtz uso de esta información.

## 2022-03-23 NOTE — PROGRESS NOTES
Adan Oliveira is a 39 y.o. female    Chief Complaint   Patient presents with    Routine Prenatal Visit     29w 4d today       LOF: no  Vaginal bleeding: no  Fetal movement (after 20 weeks): yes  Contractions: yes - randomly  Dysuria: no  Headaches, blurred vision, RUQ pain: no  Taking prenatal vitamins: yes    1. Have you been to the ER, urgent care clinic since your last visit? Hospitalized since your last visit? No    2. Have you seen or consulted any other health care providers outside of the [address] [place] [address] since your last visit? Include any pap smears or colon screening. No      Visit Vitals  /76 (BP 1 Location: Left upper arm, BP Patient Position: Sitting, BP Cuff Size: Large adult)   Pulse 92   Temp 98.2 °F (36.8 °C) (Temporal)   Resp 16   Ht 5' 0.25\" (1.53 m)   Wt 182 lb 12.8 oz (82.9 kg)   SpO2 99%   BMI 35.41 kg/m²           Health Maintenance Due   Topic Date Due    Depression Screen  Never done    COVID-19 Vaccine (1) Never done         Medication Reconciliation completed, changes noted.   Please Update medication list.

## 2022-03-24 ENCOUNTER — TELEPHONE (OUTPATIENT)
Dept: FAMILY MEDICINE CLINIC | Age: 37
End: 2022-03-24

## 2022-03-24 DIAGNOSIS — Z34.93 PRENATAL CARE IN THIRD TRIMESTER: Primary | ICD-10-CM

## 2022-03-24 DIAGNOSIS — O99.013 ANEMIA DURING PREGNANCY IN THIRD TRIMESTER: ICD-10-CM

## 2022-03-24 PROBLEM — Z83.49 FAMILY HISTORY OF THYROID DISEASE: Status: ACTIVE | Noted: 2022-03-23

## 2022-03-24 PROBLEM — Z34.80 SUPERVISION OF OTHER NORMAL PREGNANCY: Status: ACTIVE | Noted: 2022-03-23

## 2022-03-24 PROBLEM — R73.09 ABNORMAL GLUCOSE TOLERANCE TEST (GTT): Status: ACTIVE | Noted: 2022-03-23

## 2022-03-24 LAB
APPEARANCE UR: CLEAR
BILIRUB UR QL: NEGATIVE
COLOR UR: NORMAL
COMMENT, HOLDF: NORMAL
ERYTHROCYTE [DISTWIDTH] IN BLOOD BY AUTOMATED COUNT: 13.8 % (ref 11.5–14.5)
GLUCOSE 1H P 100 G GLC PO SERPL-MCNC: 139 MG/DL (ref 65–140)
GLUCOSE UR STRIP.AUTO-MCNC: NEGATIVE MG/DL
HCT VFR BLD AUTO: 33.7 % (ref 35–47)
HGB BLD-MCNC: 10.4 G/DL (ref 11.5–16)
HGB UR QL STRIP: NEGATIVE
KETONES UR QL STRIP.AUTO: NEGATIVE MG/DL
LEUKOCYTE ESTERASE UR QL STRIP.AUTO: NEGATIVE
MCH RBC QN AUTO: 28.9 PG (ref 26–34)
MCHC RBC AUTO-ENTMCNC: 30.9 G/DL (ref 30–36.5)
MCV RBC AUTO: 93.6 FL (ref 80–99)
NITRITE UR QL STRIP.AUTO: NEGATIVE
NRBC # BLD: 0 K/UL (ref 0–0.01)
NRBC BLD-RTO: 0 PER 100 WBC
PH UR STRIP: 6.5 [PH] (ref 5–8)
PLATELET # BLD AUTO: 204 K/UL (ref 150–400)
PMV BLD AUTO: 12.5 FL (ref 8.9–12.9)
PROT UR STRIP-MCNC: NEGATIVE MG/DL
RBC # BLD AUTO: 3.6 M/UL (ref 3.8–5.2)
SAMPLES BEING HELD,HOLD: NORMAL
SP GR UR REFRACTOMETRY: 1.02 (ref 1–1.03)
UROBILINOGEN UR QL STRIP.AUTO: 0.2 EU/DL (ref 0.2–1)
WBC # BLD AUTO: 9.9 K/UL (ref 3.6–11)

## 2022-03-24 RX ORDER — FERROUS SULFATE 325(65) MG
325 TABLET, DELAYED RELEASE (ENTERIC COATED) ORAL EVERY OTHER DAY
Qty: 90 TABLET | Refills: 1 | Status: SHIPPED | OUTPATIENT
Start: 2022-03-24

## 2022-03-24 NOTE — TELEPHONE ENCOUNTER
Micheline Interpretor [id number]. Called pt to discuss results and plan. Reviewed lab results. - UA wnl. Urine culture ordered. Pt understands to leave urine sample for culture in the next few days.  - Hgb 10.4, recommended iron supplement every other day. Rx sent to pharmacy. - 1 hr , ordered 3 hr GTT, staff messaged to schedule appointment. Pt instructed to call office to schedule appointment if she does not hear back by next week. Discussed w/ pt that she will need to come at 8 AM fasting when she has her lab appointment (to be scheduled).     Theodore Greco MD

## 2022-03-24 NOTE — PROGRESS NOTES
Reviewed lab results. - UA wnl, will order urine culture  - Hgb 10.4, will recommend iron supplement every other day  - 1 hr , will order 3 hr GTT, order placed, staff messaged to schedule appointment.

## 2022-03-29 ENCOUNTER — LAB ONLY (OUTPATIENT)
Dept: FAMILY MEDICINE CLINIC | Age: 37
End: 2022-03-29

## 2022-03-29 DIAGNOSIS — Z34.93 PRENATAL CARE IN THIRD TRIMESTER: ICD-10-CM

## 2022-03-30 LAB
GESTATIONAL 3HR GTT,GESTA: ABNORMAL
GLUCOSE 1H P 100 G GLC PO SERPL-MCNC: 178 MG/DL (ref 65–180)
GLUCOSE P FAST SERPL-MCNC: 99 MG/DL (ref 65–95)
GLUCOSE, 2 HR,GSTT2: 184 MG/DL (ref 65–155)
GLUCOSE, 3 HR,GSTT3: 164 MG/DL (ref 65–140)

## 2022-03-31 ENCOUNTER — TELEPHONE (OUTPATIENT)
Dept: FAMILY MEDICINE CLINIC | Age: 37
End: 2022-03-31

## 2022-03-31 DIAGNOSIS — O24.419 GESTATIONAL DIABETES MELLITUS (GDM) IN THIRD TRIMESTER, GESTATIONAL DIABETES METHOD OF CONTROL UNSPECIFIED: Primary | ICD-10-CM

## 2022-03-31 RX ORDER — BLOOD-GLUCOSE METER
EACH MISCELLANEOUS
Qty: 1 EACH | Refills: 0 | Status: SHIPPED | OUTPATIENT
Start: 2022-03-31

## 2022-03-31 RX ORDER — BLOOD SUGAR DIAGNOSTIC
STRIP MISCELLANEOUS
Qty: 100 STRIP | Refills: 1 | Status: SHIPPED | OUTPATIENT
Start: 2022-03-31 | End: 2022-05-02 | Stop reason: SDUPTHER

## 2022-03-31 RX ORDER — LANCETS
EACH MISCELLANEOUS
Qty: 1 EACH | Refills: 11 | Status: SHIPPED | OUTPATIENT
Start: 2022-03-31

## 2022-03-31 NOTE — PROGRESS NOTES
Failed 3 hr GTT. Called pt. Please see phone note. (Diet, fasting and 2 hr postprandial BG measurements, keep log). Educated on kick counts. Follow-up on 4/4/22.

## 2022-03-31 NOTE — TELEPHONE ENCOUNTER
Micheline interpretor [id number]:    Called pt. Failed 3 hr GTT. Discussed results with patient. - Counseled on diet. - Recommended pt to check BG fasting and 2 hours post-prandial. Goal <95 fasting, <120 two hr post-prandial. Counseled pt to keep a log of her readings. - Rx for BG monitoring machine, test strips, and lancets sent to pharmacy. - Pt counseled on potential adverse effects of GDM. Counseled on recording fetal movements. - Follow-up on 4/4/22 w/ Dr. Jacinto Combs at [facility]. Pt instructed to bring log of BG readings to appointment.
Ambulatory

## 2022-04-01 LAB
BACTERIA SPEC CULT: ABNORMAL
BACTERIA SPEC CULT: ABNORMAL
CC UR VC: ABNORMAL
SERVICE CMNT-IMP: ABNORMAL

## 2022-04-03 NOTE — PROGRESS NOTES
Return OB Visit       Subjective:   Lawrance Rather 39 y.o. [place]   SHIRLEY: 6/4/2022, by Last Menstrual Period  GA:  31w2d. LOF: no  Vaginal bleeding: no  Fetal movement (after 20 weeks): yes  Contractions: no      Allergies- reviewed:   No Known Allergies  Medications- reviewed:   Current Outpatient Medications   Medication Sig    lancets (ReliOn Ultra Thin Plus Lancets) misc Use as directed.  glucose blood VI test strips (ReliOn Prime Test Strips) strip Use as directed    Blood-Glucose Meter (ReliOn Prime Meter) misc Please measure your blood sugar at least four times daily (fasting and 2 hours after eating). Goal <95 fasting and <120 two hours after eating. Use as directed.  ferrous sulfate (IRON) 325 mg (65 mg iron) EC tablet Take 1 Tablet by mouth every other day.  PNV Comb #2-Iron-FA-Omega 3 29-1-400 mg cmpk Take  by mouth.  aspirin delayed-release 81 mg tablet Take 1 Tablet by mouth daily.  acetaminophen (TYLENOL PO) Take  by mouth. No current facility-administered medications for this visit. Past Medical History- reviewed:  No past medical history on file. Past Surgical History- reviewed:   No past surgical history on file. Social History- reviewed:  Social History     Socioeconomic History    Marital status: UNKNOWN     Spouse name: Not on file    Number of children: Not on file    Years of education: Not on file    Highest education level: Not on file   Occupational History    Not on file   Tobacco Use    Smoking status: Never Smoker    Smokeless tobacco: Never Used   Substance and Sexual Activity    Alcohol use:  Yes    Drug use: Not on file    Sexual activity: Not on file   Other Topics Concern    Not on file   Social History Narrative    Not on file     Social Determinants of Health     Financial Resource Strain:     Difficulty of Paying Living Expenses: Not on file   Food Insecurity:     Worried About [address] in the Last Year: Not on file    Ran Out of Food in the Last Year: Not on file   Transportation Needs:     Lack of Transportation (Medical): Not on file    Lack of Transportation (Non-Medical): Not on file   Physical Activity:     Days of Exercise per Week: Not on file    Minutes of Exercise per Session: Not on file   Stress:     Feeling of Stress : Not on file   Social Connections:     Frequency of Communication with Friends and Family: Not on file    Frequency of Social Gatherings with Friends and Family: Not on file    Attends Congregational Services: Not on file    Active Member of [address] [place] [address] [employer] or Organizations: Not on file    Attends Club or Organization Meetings: Not on file    Marital Status: Not on file   Intimate Partner Violence:     Fear of Current or Ex-Partner: Not on file    Emotionally Abused: Not on file    Physically Abused: Not on file    Sexually Abused: Not on file   Housing Stability:     Unable to Pay for Housing in the Last Year: Not on file    Number of [place] in the Last Year: Not on file    Unstable Housing in the Last Year: Not on file     Immunizations- reviewed:   Immunization History   Administered Date(s) Administered    Influenza Vaccine Retora Black) PF (>6 Mo Flulaval, Fluarix, and >3 Yrs Afluria, Fluzone 16983) 03/09/2022    Tdap 03/09/2022       Objective:     Visit Vitals  /69   Pulse (!) 104   Temp 97.3 °F (36.3 °C) (Temporal)   Resp 17   Ht 5' 0.25\" (1.53 m)   Wt 185 lb (83.9 kg)   LMP 08/28/2021   SpO2 96%   BMI 35.83 kg/m²       Physical Exam:  GENERAL APPEARANCE: alert, well appearing, in no apparent distress  ABDOMEN: gravid, fundal height 31 cm, FHT present at 150 bpm  PSYCH: normal mood and affect    Labs  No results found for this or any previous visit (from the past 12 hour(s)). Assessment         ICD-10-CM ICD-9-CM    1. 31 weeks gestation of pregnancy  Z3A.31 V22.2    2.  Gestational diabetes mellitus (GDM) in third trimester, gestational diabetes method of control unspecified  O24.419 648.83 REFERRAL TO DIABETIC EDUCATION   3. Anemia during pregnancy in third trimester  O99.013 648.23    4. Multigravida of advanced maternal age in second trimester  O09.522 65.56    5. Obesity in pregnancy  O99.210 649.10          Plan   39 y.o.  31w2d SHIRLEY 2022, by Last Menstrual Period here for return OB visit     IUP: B+, Ab neg, Hgb fractionation normal, HIV/G/C/RPR/Hep B/Hep C neg, Rub/VZV immune, Ucx neg, Pap NILM, Failed 1hr and 3hr GTT  -Panorama low risk  - S/p Flu and Tdap  - U/S () @ 23w0d: Normal anatomy, UUN 466 g 44th%,   - U/S (3/7) @ 27w2d: Normal anatomy,  g 47th%, suboptimal anatomy from last time well visualized. - Continue taking prenatal vitamins daily      Gestational Diabetes: Failed 3 hr GTT. A1C 5.6. Today values are mostly at goal. Some nighttime values elevated, discussed eating lower carb diet especially at dinner, cut out sodas and sweets. -Referral to diabetes education placed    Anemia: Hgb 10.4 on 3/23/22  -Continue oral iron     Urinary frequency: UCx 50k staph aureus on 3/23/22     Late to care:  Pt presented at 20 weeks. - Continue with PNV     Advance maternal age:   - Continue ASA 81 mg daily     Obesity in pregnancy:  - Recommend exercise in pregnancy at least 3 or more times weekly, mild to moderate intensity.  Avoid activities that cause abdominal trauma.    - Discussed appropriate diet during pregnancy, foods to avoid and stressed importance of hydration      Family hx of thyroid disease: TSH wnl.        · Other  ? Continuity Provider: None  ? Pain mgmt. in labor: TBD  ? Feeding: TBD  ? Circ: TBD  ?  Social: TBD        Orders Placed This Encounter    REFERRAL TO DIABETIC EDUCATION     Referral Priority:   Routine     Referral Type:   Consultation     Referral Reason:   Specialty Services Required     Requested Specialty:   Endocrinology     Number of Visits Requested:   1     Labor precautions discussed, including: Regular painful contractions, lasting for greater than one hour, taking your breath away; any vaginal bleeding; any leakage of fluid; or absent or decreased fetal movement. Call M.D. on call if any of these symptoms or signs occur. I have discussed the diagnosis with the patient and the intended plan as seen in the above orders. The patient has received an after-visit summary and questions were answered concerning future plans. I have discussed medication side effects and warnings with the patient as well. Informed pt to return to the office or go to the ER if she experiences vaginal bleeding, vaginal discharge, leaking of fluid, pelvic cramping.     Pt discussed with Dr. Wilfredo Duncan (attending physician)    Ady Recio DO  Family Medicine Resident

## 2022-04-04 ENCOUNTER — ROUTINE PRENATAL (OUTPATIENT)
Dept: FAMILY MEDICINE CLINIC | Age: 37
End: 2022-04-04
Payer: MEDICAID

## 2022-04-04 VITALS
RESPIRATION RATE: 17 BRPM | SYSTOLIC BLOOD PRESSURE: 109 MMHG | TEMPERATURE: 97.3 F | WEIGHT: 185 LBS | HEIGHT: 60 IN | BODY MASS INDEX: 36.32 KG/M2 | DIASTOLIC BLOOD PRESSURE: 69 MMHG | HEART RATE: 104 BPM | OXYGEN SATURATION: 96 %

## 2022-04-04 DIAGNOSIS — O24.419 GESTATIONAL DIABETES MELLITUS (GDM) IN THIRD TRIMESTER, GESTATIONAL DIABETES METHOD OF CONTROL UNSPECIFIED: ICD-10-CM

## 2022-04-04 DIAGNOSIS — Z3A.31 31 WEEKS GESTATION OF PREGNANCY: Primary | ICD-10-CM

## 2022-04-04 DIAGNOSIS — O99.210 OBESITY IN PREGNANCY: ICD-10-CM

## 2022-04-04 DIAGNOSIS — O99.013 ANEMIA DURING PREGNANCY IN THIRD TRIMESTER: ICD-10-CM

## 2022-04-04 DIAGNOSIS — O09.522 MULTIGRAVIDA OF ADVANCED MATERNAL AGE IN SECOND TRIMESTER: ICD-10-CM

## 2022-04-04 PROCEDURE — 0502F SUBSEQUENT PRENATAL CARE: CPT | Performed by: STUDENT IN AN ORGANIZED HEALTH CARE EDUCATION/TRAINING PROGRAM

## 2022-04-04 NOTE — PROGRESS NOTES
Chief Complaint   Patient presents with    Routine Prenatal Visit     .  31w 2d today. No c/o.     1. Have you been to the ER, urgent care clinic since your last visit? Hospitalized since your last visit? No    2. Have you seen or consulted any other health care providers outside of the [address] [place] [address] since your last visit? Include any pap smears or colon screening.  No

## 2022-04-04 NOTE — PATIENT INSTRUCTIONS
Semanas 30 a 32 de mtz embarazo: Instrucciones de cuidado  Weeks 30 to 32 of Your Pregnancy: Care Instructions  Instrucciones de cuidado     Ha llegado a los últimos meses de embarazo. A estas [place], mtz bebé en verdad comienza a tener la apariencia de un bebé, con juan carlos y piel rellenita. A medida que entra en las [facility]imas [facility]anas [facility] comenzará a caer en la realidad de que va a tener un bebé. Fernando es el momento de elegir un nombre, ordenar mtz casa, organizar un cuarto de niños seguro y buscar atención infantil de calidad, de ser necesaria. Hacer esto con anterioridad le permitirá concentrarse en cuidar y disfrutar de mtz bebé. También podría hacer hakeem visita a la marty de partos del hospital para [place] mejor idea sobre qué esperar mientras está en el hospital.  Perry estos últimos meses, es muy importante que se cuide y preste atención a lo que mtz cuerpo necesita. Si mtz médico le dice que está loretta que trabaje, no se exija demasiado. Siga las recomendaciones proporcionadas en esta hoja de cuidados para aliviar la acidez estomacal y [address] várices. Si todavía no le stein aplicado la vacuna Tdap (tétanos, difteria y tos [place]) perry fernando [facility], hable con mtz médico acerca de aplicársela. Leda Molt a proteger a mtz recién nacido contra la infección por tos ferina. La atención de seguimiento es hakeem parte clave de mtz tratamiento y seguridad. Asegúrese de hacer y acudir a todas las citas, y llame a mtz médico si está teniendo problemas. También es hakeem buena idea saber los resultados de faith exámenes y mantener hakeem lista de los medicamentos que case. ¿Cómo puede cuidarse en el hogar? Preste atención a los movimientos de mtz bebé  · Debería sentir que mtz bebé se mueve varias veces al día. · Mtz bebé ahora cambia menos de posición, y patea y da golpes con más frecuencia. · Mtz bebé duerme de 20 a 45 minutos cada [facility] y [address] en determinados momentos del día.   · Si el médico quiere que cuente las patadas del bebé:  ? Vacíe mtz vejiga y acuéstese de lado o recuéstese en hakeem silla cómoda. ? Anote la hora inicial.  ? Preste atención solo a los movimientos de mtz bebé. Cuente todos los movimientos, excepto los del hipo. ? Después de que haya contado 10 movimientos, anote la hora. ? Anote cuántos minutos le llevaron a mtz bebé los 10 movimientos. ? Si pasa hakeem hora y no ha registrado 10 movimientos, coma o elsa algo y [place] cuente perry otra hora. Llame a mtz médico si no registra al menos 10 movimientos en marita período de 2 horas. Alivie la acidez estomacal  · Coma comidas pequeñas y frecuentes. · No coma chocolate, menta ni comidas muy picantes. Evite las bebidas con cafeína, rory el café, el té y las sodas. · Evite doblarse hacia adelante o acostarse después de comer. · Rocio hakeem caminata corta después de comer. · Si tiene problemas de [place] estomacal perry la noche, no coma por 2 horas antes de acostarse. · [place] antiácidos, rory Mylanta, Maalox, Rolaids o Tums. No tome antiácidos que contengan bicarbonato de sodio. Cuide las várices  · Las várices son vasos sanguíneos que se dilatan debido a la mayor cantidad de eliane perry el embarazo. Puede tener dolor o palpitaciones en las piernas. La mayoría de las várices desaparecerán después del [place]. · Evite estar toledo perry mucho tiempo. Siéntese con las piernas cruzadas a la altura de los tobillos, no de las rodillas. · Siéntese con los pies elevados. · Evite usar ropa o medias ajustadas. Use medias de compresión. · Rocio ejercicio con regularidad. Trate de caminar por lo menos 30 minutos al día. ¿Dónde puede encontrar más información en inglés? Vaya a http://www.[employer]ix/  Juhi [id number] en la búsqueda para aprender más acerca de \"Semanas 30 a 28 de mtz embarazo: Instrucciones de cuidado. \"  Revisado: 16 junio, 2021               Versión del contenido: 13.2  © 6573-5021 Healthwise, Incorporated.    Carmen Denny instrucciones de cuidado fueron adaptadas bajo licencia por [facility] (which disclaims liability or warranty for this information). Si usted tiene [place] afección médica o sobre estas instrucciones, siempre pregunte a mtz profesional de juan antonio. [facility] niega toda garantía o responsabilidad por mtz uso de esta información.

## 2022-04-04 NOTE — PROGRESS NOTES
I reviewed with the resident the medical history and the resident's findings on the physical examination. I discussed with the resident the patient's diagnosis and concur with the plan. 44yo  at 31w2d by L/23 here for return OB. 1. IUP: RH pos, NIPT wnl, anatomy w/o anomalies, has gotten flu and tdap   2. Late to care: established at 23w4   3. A1GDM: Resident reviewed - Discussed risks of uncontrolled glucose including but not limited to macrosomia, birth injury, increased need for , birth defects, miscarriage/fetal demise,  hypoglycemia, persistent diabetes after pregnancy. First visit since dx, has been checking sugars and mostly at goal, education provided today, has close follow-up in 1 week to review log.   4. Family hx thyroid disease: TSH wnl  5.  AMA+obesity: continue daily ASA     [address] [facility], [address] [place] [address] Family Medicine  2022

## 2022-04-13 ENCOUNTER — HOSPITAL ENCOUNTER (OUTPATIENT)
Dept: PERINATAL CARE | Age: 37
Discharge: HOME | End: 2022-04-13
Attending: OBSTETRICS & GYNECOLOGY
Payer: MEDICAID

## 2022-04-13 PROCEDURE — 76816 OB US FOLLOW-UP PER FETUS: CPT | Performed by: OBSTETRICS & GYNECOLOGY

## 2022-04-22 ENCOUNTER — TELEPHONE (OUTPATIENT)
Dept: FAMILY MEDICINE CLINIC | Age: 37
End: 2022-04-22

## 2022-04-25 ENCOUNTER — TELEPHONE (OUTPATIENT)
Dept: FAMILY MEDICINE CLINIC | Age: 37
End: 2022-04-25

## 2022-04-25 NOTE — TELEPHONE ENCOUNTER
----- Message from [facility] sent at 4/22/2022 11:05 AM EDT -----  Subject: Message to Provider    QUESTIONS  Information for Provider? Patient called to schedule a prenatal   appointment with this pcp, give patient a call back to advise further. ---------------------------------------------------------------------------  --------------  [email or web address] INFO  What is the best way for the office to contact you? OK to leave message on   voicemail  Preferred Call Back Phone Number? [phone]  ---------------------------------------------------------------------------  --------------  SCRIPT ANSWERS  Relationship to Patient?  Self

## 2022-05-02 ENCOUNTER — ROUTINE PRENATAL (OUTPATIENT)
Dept: FAMILY MEDICINE CLINIC | Age: 37
End: 2022-05-02
Payer: MEDICAID

## 2022-05-02 VITALS
DIASTOLIC BLOOD PRESSURE: 69 MMHG | BODY MASS INDEX: 36.71 KG/M2 | HEIGHT: 60 IN | OXYGEN SATURATION: 98 % | RESPIRATION RATE: 17 BRPM | WEIGHT: 187 LBS | HEART RATE: 91 BPM | TEMPERATURE: 98.1 F | SYSTOLIC BLOOD PRESSURE: 105 MMHG

## 2022-05-02 DIAGNOSIS — Z3A.35 35 WEEKS GESTATION OF PREGNANCY: Primary | ICD-10-CM

## 2022-05-02 DIAGNOSIS — O24.410 DIET CONTROLLED GESTATIONAL DIABETES MELLITUS (GDM) IN THIRD TRIMESTER: ICD-10-CM

## 2022-05-02 PROCEDURE — 0502F SUBSEQUENT PRENATAL CARE: CPT | Performed by: STUDENT IN AN ORGANIZED HEALTH CARE EDUCATION/TRAINING PROGRAM

## 2022-05-02 RX ORDER — BLOOD SUGAR DIAGNOSTIC
STRIP MISCELLANEOUS
Qty: 100 STRIP | Refills: 1 | Status: SHIPPED | OUTPATIENT
Start: 2022-05-02

## 2022-05-02 NOTE — PATIENT INSTRUCTIONS
[place]: Instrucciones de cuidado  Nutrition During Pregnancy: Care Instructions  Instrucciones de cuidado     [place] en forma saludable perry el embarazo es importante para usted y mtz bebé. Puede ayudarla a sentirse loretta y tener un embarazo y parto exitosos. [place] el embarazo faith necesidades nutricionales aumentan. Aun si tiene excelentes hábitos alimentarios, mtz médico le podría recomendar un multivitamínico para asegurarse de que reciba suficiente welilngton y ácido fólico.  Muchas mujeres embarazadas se preguntan cuánto deben aumentar de peso. En general, a las mujeres que antes del [facility] tenían un peso saludable, se les recomienda aumentar entre 25 y 28 libras (entre 11 y 16 kg). A las mujeres que tenían sobrepeso antes del [facility], se les suele recomendar que aumenten de 15 a 25 libras (de 7 a 11 kg). A las mujeres cuyo peso estaba debajo del peso normal antes del [facility], se les suele recomendar que aumenten de 28 a 40 libras (de 13 a 18 kg). Colaborará con mtz médico para establecer un peso objetivo. Aumentar hakeem cantidad saludable de peso la ayudará a tener un bebé tomi. La atención de seguimiento es hakeem parte clave de mtz tratamiento y seguridad. Asegúrese de hacer y acudir a todas las citas, y llame a mtz médico si está teniendo problemas. También es hakeem buena idea saber los resultados de faith exámenes y mantener hakeem lista de los medicamentos que case. ¿Cómo puede cuidarse en el hogar? · Coma abundantes frutas y verduras. Incluya hakeem variedad de verduras de hoja heath oscuro, color naranja y [employer]. · Escoja panes, cereales y pastas de grano entero. Los panes y las pastas de annita entero, el arroz integral y la eileen son Junior Conch opciones. · Consuma 4 porciones o más de [place] y [facility]. [employer] o semidescremada, y el yogur y el queso sin grasa o con poca grasa son Junior Conch opciones.  Si no puede consumir lácteos, obtenga el calcio que mtz cuerpo necesita de productos enriquecidos con calcio, rory jugo de [place], leche de soya y tofu. Entre otras silva no lácteas de calcio se [employer] verduras de hojas verdes, rory el brócoli, la col rizada, las hojas de [place], las hojas de nabo, el bok [place] y las coles de [place]. · Si come carne, escoja aquellos tipos que tengan menos grasa. Luis Antonio Buckner opciones son los blevins [place] de carne y el anjum o el pavo sin piel. · No coma tiburón, pez tg, caballa marielena ni blanquillo. Tienen alto contenido de rodríguez, el cual es peligroso para mtz bebé. Puede comer hasta 12 onzas a la semana de pescado o de mariscos que tengan bajos niveles de rodríguez. Las buenas opciones incluyen camarón, salmón joyce, abadejo y [place]. Limite algunos otros tipos de pescado, rory el atún bonner (albacora) a 4 onzas (0.1 kg) a la semana. · [place] embutidos, rory el de [place], el jamón y la Oto, a 165°F (74°C) antes de comerlos. [place] disminuye el riesgo de enfermarse a causa de un tipo de bacteria que puede encontrarse en los embutidos. · No coma quesos blandos sin pasteurizar, rory queso \"brie\", feta, mozzarella fresca y [place]. Estas clases de quesos tienen hakeem bacteria que puede perjudicar al bebé. · Limite la cafeína. Si makayla café o té, no tome más de 1 taza al día. Las bebidas cola también contienen cafeína. · No elsa nada de alcohol. No se ha determinado que ninguna cantidad de alcohol sea hunt perry el embarazo. · No se ponga a dieta ni trate de bajar de peso. Por ejemplo, no siga hakeem dieta baja en carbohidratos. Si tiene sobrepeso al comienzo del [facility], mtz médico colaborará con usted para manejar el aumento de [place]. · Infórmele a mzt médico sobre todas las vitaminas y los suplementos que tome. ¿Cuándo debe pedir ayuda? Vigile muy de cerca los cambios en mtz juan antonio, y asegúrese de comunicarse con mtz médico si tiene algún problema.   ¿Dónde puede encontrar más información en joon? Timothy a http://www.gray.com/  Juhi S033 en la búsqueda para aprender más acerca de \"Nutrición perry el embarazo: Instrucciones de cuidado. \"  Revisado: 16 junio, 2021               Versión del contenido: 13.2  © 4520-0890 Healthwise, eegoes. Las instrucciones de cuidado fueron adaptadas bajo licencia por Good [employer] (which disclaims liability or warranty for this information). Si usted tiene [place] afección médica o sobre estas instrucciones, siempre pregunte a mtz profesional de juan antonio. [employer], eegoes niega toda garantía o responsabilidad por mtz uso de esta información.

## 2022-05-02 NOTE — PROGRESS NOTES
Chief Complaint   Patient presents with    Routine Prenatal Visit     Patient states she has abdominal pain rated at a 4. Patient states that she has discharge with some blood in it. It started yesterday. Visit Vitals  /89 (BP 1 Location: Right arm, BP Patient Position: Sitting, BP Cuff Size: Adult)   Pulse (!) 115   Temp 98.1 °F (36.7 °C) (Oral)   Resp 17   Ht 5' (1.524 m)   Wt 187 lb (84.8 kg)   SpO2 98%   BMI 36.52 kg/m²     1. Have you been to the ER, urgent care clinic since your last visit? Hospitalized since your last visit? No    2. Have you seen or consulted any other health care providers outside of the [address] [place] [address] since your last visit? Include any pap smears or colon screening.  No

## 2022-05-04 ENCOUNTER — HOSPITAL ENCOUNTER (INPATIENT)
Age: 37
LOS: 2 days | Discharge: HOME | DRG: 560 | End: 2022-05-06
Attending: FAMILY MEDICINE | Admitting: OBSTETRICS & GYNECOLOGY
Payer: MEDICAID

## 2022-05-04 PROBLEM — Z34.90 PREGNANCY: Status: ACTIVE | Noted: 2022-05-04

## 2022-05-04 LAB
ABO + RH BLD: NORMAL
ALBUMIN SERPL-MCNC: 2.5 G/DL (ref 3.5–5)
ALBUMIN/GLOB SERPL: 0.6 {RATIO} (ref 1.1–2.2)
ALP SERPL-CCNC: 122 U/L (ref 45–117)
ALT SERPL-CCNC: 16 U/L (ref 12–78)
ANION GAP SERPL CALC-SCNC: 9 MMOL/L (ref 5–15)
AST SERPL-CCNC: 22 U/L (ref 15–37)
BILIRUB SERPL-MCNC: <0.1 MG/DL (ref 0.2–1)
BLOOD GROUP ANTIBODIES SERPL: NORMAL
BUN SERPL-MCNC: 6 MG/DL (ref 6–20)
BUN/CREAT SERPL: 11 (ref 12–20)
CALCIUM SERPL-MCNC: 9 MG/DL (ref 8.5–10.1)
CHLORIDE SERPL-SCNC: 105 MMOL/L (ref 97–108)
CO2 SERPL-SCNC: 24 MMOL/L (ref 21–32)
CREAT SERPL-MCNC: 0.57 MG/DL (ref 0.55–1.02)
ERYTHROCYTE [DISTWIDTH] IN BLOOD BY AUTOMATED COUNT: 13.7 % (ref 11.5–14.5)
GLOBULIN SER CALC-MCNC: 4.1 G/DL (ref 2–4)
GLUCOSE SERPL-MCNC: 97 MG/DL (ref 65–100)
HCT VFR BLD AUTO: 31.5 % (ref 35–47)
HGB BLD-MCNC: 10.1 G/DL (ref 11.5–16)
MCH RBC QN AUTO: 27.4 PG (ref 26–34)
MCHC RBC AUTO-ENTMCNC: 32.1 G/DL (ref 30–36.5)
MCV RBC AUTO: 85.6 FL (ref 80–99)
NRBC # BLD: 0 K/UL (ref 0–0.01)
NRBC BLD-RTO: 0 PER 100 WBC
PLATELET # BLD AUTO: 233 K/UL (ref 150–400)
PMV BLD AUTO: 12.2 FL (ref 8.9–12.9)
POTASSIUM SERPL-SCNC: 4 MMOL/L (ref 3.5–5.1)
PROT SERPL-MCNC: 6.6 G/DL (ref 6.4–8.2)
RBC # BLD AUTO: 3.68 M/UL (ref 3.8–5.2)
SODIUM SERPL-SCNC: 138 MMOL/L (ref 136–145)
SPECIMEN EXP DATE BLD: NORMAL
WBC # BLD AUTO: 11.7 K/UL (ref 3.6–11)

## 2022-05-04 PROCEDURE — 75410000000 HC DELIVERY VAGINAL/SINGLE: Performed by: OBSTETRICS & GYNECOLOGY

## 2022-05-04 PROCEDURE — 65270000029 HC RM PRIVATE

## 2022-05-04 PROCEDURE — 86900 BLOOD TYPING SEROLOGIC ABO: CPT

## 2022-05-04 PROCEDURE — 74011250637 HC RX REV CODE- 250/637: Performed by: OBSTETRICS & GYNECOLOGY

## 2022-05-04 PROCEDURE — 75410000003 HC RECOV DEL/VAG/CSECN EA 0.5 HR: Performed by: OBSTETRICS & GYNECOLOGY

## 2022-05-04 PROCEDURE — 75810000275 HC EMERGENCY DEPT VISIT NO LEVEL OF CARE

## 2022-05-04 PROCEDURE — 74011250637 HC RX REV CODE- 250/637

## 2022-05-04 PROCEDURE — 74011250636 HC RX REV CODE- 250/636: Performed by: OBSTETRICS & GYNECOLOGY

## 2022-05-04 PROCEDURE — 36415 COLL VENOUS BLD VENIPUNCTURE: CPT

## 2022-05-04 PROCEDURE — 4A1HXCZ MONITORING OF PRODUCTS OF CONCEPTION, CARDIAC RATE, EXTERNAL APPROACH: ICD-10-PCS | Performed by: OBSTETRICS & GYNECOLOGY

## 2022-05-04 PROCEDURE — 74011000258 HC RX REV CODE- 258: Performed by: OBSTETRICS & GYNECOLOGY

## 2022-05-04 PROCEDURE — 80053 COMPREHEN METABOLIC PANEL: CPT

## 2022-05-04 PROCEDURE — 85027 COMPLETE CBC AUTOMATED: CPT

## 2022-05-04 PROCEDURE — 75410000002 HC LABOR FEE PER 1 HR: Performed by: OBSTETRICS & GYNECOLOGY

## 2022-05-04 RX ORDER — SODIUM CHLORIDE 0.9 % (FLUSH) 0.9 %
5-40 SYRINGE (ML) INJECTION AS NEEDED
Status: DISCONTINUED | OUTPATIENT
Start: 2022-05-04 | End: 2022-05-04

## 2022-05-04 RX ORDER — OXYTOCIN/RINGER'S LACTATE 30/500 ML
87.3 PLASTIC BAG, INJECTION (ML) INTRAVENOUS AS NEEDED
Status: COMPLETED | OUTPATIENT
Start: 2022-05-04 | End: 2022-05-04

## 2022-05-04 RX ORDER — OXYTOCIN/RINGER'S LACTATE 30/500 ML
10 PLASTIC BAG, INJECTION (ML) INTRAVENOUS AS NEEDED
Status: DISCONTINUED | OUTPATIENT
Start: 2022-05-04 | End: 2022-05-06 | Stop reason: HOSPADM

## 2022-05-04 RX ORDER — FOLIC ACID/MULTIVIT,IRON,MINER 0.4MG-18MG
1 TABLET ORAL DAILY
Status: DISCONTINUED | OUTPATIENT
Start: 2022-05-04 | End: 2022-05-06 | Stop reason: HOSPADM

## 2022-05-04 RX ORDER — SODIUM CHLORIDE, SODIUM LACTATE, POTASSIUM CHLORIDE, CALCIUM CHLORIDE 600; 310; 30; 20 MG/100ML; MG/100ML; MG/100ML; MG/100ML
125 INJECTION, SOLUTION INTRAVENOUS CONTINUOUS
Status: DISCONTINUED | OUTPATIENT
Start: 2022-05-04 | End: 2022-05-06 | Stop reason: HOSPADM

## 2022-05-04 RX ORDER — MISOPROSTOL 200 UG/1
800 TABLET ORAL ONCE
Status: COMPLETED | OUTPATIENT
Start: 2022-05-04 | End: 2022-05-04

## 2022-05-04 RX ORDER — IBUPROFEN 800 MG/1
800 TABLET ORAL EVERY 8 HOURS
Status: DISCONTINUED | OUTPATIENT
Start: 2022-05-04 | End: 2022-05-06 | Stop reason: HOSPADM

## 2022-05-04 RX ORDER — SODIUM CHLORIDE 0.9 % (FLUSH) 0.9 %
5-40 SYRINGE (ML) INJECTION EVERY 8 HOURS
Status: DISCONTINUED | OUTPATIENT
Start: 2022-05-04 | End: 2022-05-04

## 2022-05-04 RX ORDER — LANOLIN ALCOHOL/MO/W.PET/CERES
1 CREAM (GRAM) TOPICAL
Status: DISCONTINUED | OUTPATIENT
Start: 2022-05-04 | End: 2022-05-06 | Stop reason: HOSPADM

## 2022-05-04 RX ORDER — ONDANSETRON 2 MG/ML
4 INJECTION INTRAMUSCULAR; INTRAVENOUS
Status: DISCONTINUED | OUTPATIENT
Start: 2022-05-04 | End: 2022-05-04 | Stop reason: HOSPADM

## 2022-05-04 RX ORDER — NALOXONE HYDROCHLORIDE 0.4 MG/ML
0.4 INJECTION, SOLUTION INTRAMUSCULAR; INTRAVENOUS; SUBCUTANEOUS AS NEEDED
Status: DISCONTINUED | OUTPATIENT
Start: 2022-05-04 | End: 2022-05-04 | Stop reason: HOSPADM

## 2022-05-04 RX ORDER — ACETAMINOPHEN 325 MG/1
650 TABLET ORAL
Status: DISCONTINUED | OUTPATIENT
Start: 2022-05-04 | End: 2022-05-06 | Stop reason: HOSPADM

## 2022-05-04 RX ADMIN — IBUPROFEN 800 MG: 800 TABLET, FILM COATED ORAL at 22:14

## 2022-05-04 RX ADMIN — IBUPROFEN 800 MG: 800 TABLET, FILM COATED ORAL at 13:56

## 2022-05-04 RX ADMIN — IBUPROFEN 800 MG: 800 TABLET, FILM COATED ORAL at 05:56

## 2022-05-04 RX ADMIN — MISOPROSTOL 800 MCG: 200 TABLET ORAL at 01:56

## 2022-05-04 RX ADMIN — OXYTOCIN 999 MILLI-UNITS/MIN: 10 INJECTION, SOLUTION INTRAMUSCULAR; INTRAVENOUS at 01:50

## 2022-05-04 RX ADMIN — SODIUM CHLORIDE 5 MILLION UNITS: 900 INJECTION INTRAVENOUS at 01:43

## 2022-05-04 RX ADMIN — SODIUM CHLORIDE, POTASSIUM CHLORIDE, SODIUM LACTATE AND CALCIUM CHLORIDE 125 ML/HR: 600; 310; 30; 20 INJECTION, SOLUTION INTRAVENOUS at 01:42

## 2022-05-04 NOTE — PROGRESS NOTES
NUTRITION    Best practice alert was triggered based on results obtained during nursing admission assessment for gestational diabetes and pregnant (not in labor). Pt delivered with normal BG this morning. The patient's chart was reviewed and nutrition assessment is not indicated at this time. Plan to see patient for rescreen as indicated. Thank you.      Lab Results   Component Value Date/Time    Glucose 97 05/04/2022 01:41 AM     Lab Results   Component Value Date/Time    Hemoglobin A1c 5.6 02/09/2022 11:56 AM     Hasmukh Rangel RD, MS  Contact via [facility] or office [phone]

## 2022-05-04 NOTE — PROGRESS NOTES
0700 [facility]. Care handed over at this time.

## 2022-05-04 NOTE — LACTATION NOTE
This note was copied from a baby's chart. Mom has decided to formula feed only. Mom's choice supported.

## 2022-05-04 NOTE — PROGRESS NOTES
5/4/2022  11:39 AM    CM met with DREA to complete initial assessment and begin discharge planning. MOB verified and confirmed demographics. DREA lives with Zeyad Juares, along wit her 13yr old, at the address on file. DREA noted that her other children live in [place]. DREA is employed and plans to take adequate time off from work. FOYOLANDA is also employed and will be taking some time off off. DREA reports she has good family support, and feels like she has the support she needs when she returns home. DREA plans to breast and bottle feed baby. [facility] will provide follow up care for infant. DREA has car seat, bassinet/crib, clothing, bottles and all necessary supplies for baby. DREA has [place], and will be adding baby to this policy. CM discussed process to add baby to insurance, MOB verbalized understanding. DREA is also enrolled in [facility] and understands how to add baby to program.  Care Management Interventions  PCP Verified by CM: Yes ([facility])  Mode of Transport at Discharge:  Other (see comment)  Transition of Care Consult (CM Consult): Discharge Planning  Support Systems: Other Family Member(s),Spouse/Significant Other  Confirm Follow Up Transport: Family  Discharge Location  Patient Expects to be Discharged to[de-identified] Home with family assistance  Amber Pleitez

## 2022-05-04 NOTE — PROGRESS NOTES
0030 pt arrived ambulatory with complaints of contractions and ROM at 2100    0100 SVE 4-5/90/0. IV x 1 placed. Will notify MD and residents    0130 pt complaining of rectal pressure, SVE 6-7/100/+1. RN will notify MD    0149 Pt actively trying to push, RN pulled emergency cord    0150 Female infant delievered. Cord avulsion RN immediately grabbed infants cord and clamped. Charge RN clamped other side of cord. 2552 Verbal order per Dr. Goodson Son for 800mcg cytotec rectally    0200 pt stable, clean sheets, gown and pads applied. QBL 315ml    0205 pt holding infant in bed, FOB at bedside    0400 pt voided 500ml, 2 hour QBL 25ml. Pericare and clean pad applied    0556 Pt voided 400ml, pericare and clean pad applied    0700 Bedside and Verbal shift change report given to MARINO Powers RN (oncoming nurse) by DORCAS Patel RN (offgoing nurse). Report included the following information SBAR, Kardex, Intake/Output, MAR and Recent Results.

## 2022-05-04 NOTE — ROUTINE PROCESS
Bedside and verbal shift change report given to oncoming nurse, as assigned, by offgoing nurse, Kalyn Johnson RN. Report included SBAR, Kardex, I&Os, Recent Results, Procedures, MAR, and changes in patient status. Oncoming nurse and patient given opportunity for questions.

## 2022-05-04 NOTE — L&D DELIVERY NOTE
This patient was 30 or more weeks gestation at the time of [facility] go-live. For complete information pertaining to this patient's pregnancy, please refer to the paper chart and ACOG form. Delivery Note    Obstetrician:  Francheska Stephens MD    Assistant: none    Pre-Delivery Diagnosis:  pregnancy <37 weeks    Post-Delivery Diagnosis: Living  infant(s) and Female    Intrapartum Event: Precipitous labor (less than 3 hours)    Procedure: Spontaneous vaginal delivery    Epidural: NO    Monitor:  Fetal Heart Tones - External and Uterine Contractions - External    Indications for instrumental delivery: none    Estimated Blood Loss: 300  Episiotomy: n/a    Laceration(s):  none    Laceration(s) repair: NO    Presentation: Cephalic    Fetal Description: davis    Fetal Position: Occiput Anterior    Birth Weight: pending    Birth Length: pending    Apgar - One Minute: 9    Apgar - Five Minutes: 9    Umbilical Cord: 3 vessels present    Specimens: placenta, not sent           Complications:  none           Cord Blood Results:   Information for the patient's :  Ailyn Stage [[id number]]   No results found for: Tsosie Blinks, ABORH     Prenatal Labs:     Lab Results   Component Value Date/Time    ABO/Rh(D) B POSITIVE 2022 10:55 AM        Attending Attestation: I was called to room for delivery, when I arrived the infant had just been delivered and the cord had avulsed due to fast delivery and short cord. The cord was grasped and the placenta delivered without incident. 800 mcg were placed in the rectum since pt was grand multip and delivered so rapidly. Uterus firm and baby stable post delivery.       Signed By:  Francheska Stephens MD     May 4, 2022

## 2022-05-04 NOTE — PROGRESS NOTES
TRANSFER - OUT REPORT:    Verbal report given to Duran Hdz RN(name) on Sunita Cordero  being transferred to MIU(unit) for routine progression of care       Report consisted of patients Situation, Background, Assessment and   Recommendations(SBAR). Information from the following report(s) SBAR, Kardex, Procedure Summary, Intake/Output, MAR and Recent Results was reviewed with the receiving nurse. Lines:       Opportunity for questions and clarification was provided.       Patient transported with:   Registered Nurse

## 2022-05-05 PROCEDURE — 74011250637 HC RX REV CODE- 250/637

## 2022-05-05 PROCEDURE — 65270000029 HC RM PRIVATE

## 2022-05-05 RX ORDER — IBUPROFEN 800 MG/1
800 TABLET ORAL EVERY 8 HOURS
Qty: 30 TABLET | Refills: 0 | Status: SHIPPED | OUTPATIENT
Start: 2022-05-05 | End: 2022-05-14

## 2022-05-05 RX ORDER — DOCUSATE SODIUM 100 MG/1
100 CAPSULE, LIQUID FILLED ORAL 2 TIMES DAILY
Qty: 20 CAPSULE | Refills: 0 | Status: SHIPPED | OUTPATIENT
Start: 2022-05-05

## 2022-05-05 RX ADMIN — IBUPROFEN 800 MG: 800 TABLET, FILM COATED ORAL at 07:36

## 2022-05-05 RX ADMIN — FERROUS SULFATE TAB 325 MG (65 MG ELEMENTAL FE) 325 MG: 325 (65 FE) TAB at 11:51

## 2022-05-05 RX ADMIN — IBUPROFEN 800 MG: 800 TABLET, FILM COATED ORAL at 15:03

## 2022-05-05 RX ADMIN — Medication 1 TABLET: at 11:51

## 2022-05-05 NOTE — PROGRESS NOTES
Bedside and verbal shift change report given to oncoming nurse, as assigned, by offgoing nurse, Karin Mitchell RN. Report included SBAR, Kardex, I&Os, Recent Results, Procedures, MAR, and changes in patient status. Oncoming nurse and patient given opportunity for questions.

## 2022-05-05 NOTE — PROGRESS NOTES
[address] [facility] [address]   Office [phone], Fax [phone]                                Post-Partum Day Number 1 Progress Note    Patient doing well post-partum without significant complaint. Pain well controlled. Lochia controlled. Tolerating diet. Ambulating. Voiding without difficulty. Passing flatus. Vitals:  Patient Vitals for the past 8 hrs:   BP Temp Pulse Resp SpO2   22 0829 120/64 98.5 °F (36.9 °C) 73 16 98 %     Temp (24hrs), Av.2 °F (36.8 °C), Min:97.6 °F (36.4 °C), Max:98.5 °F (36.9 °C)      Exam:  Patient without distress. CTAB, no w/r/r/c.               RRR, +S1 and S2, no m/r/g. Abdomen soft, fundus firm at level of umbilicus, nontender. Lower extremities:  No edema. No palpable cords or tenderness. Assessment and Plan:    Waynetta Denver is a 39 y.o. [id number] s/p precipitous  at 35 weeks 4 days. Pregnancy was complicated by elevated BP at time of delivery, GDM (no meds), anemia, late to care, AMA, obesity. Patient appears to be having uncomplicated post-partum course. 1. Continue routine perineal care and maternal education. 2. Plan discharge tomorrow if no problems occur. 3. Elevated BP: At time of delivery. LFTs and PLT were normal. Unable to collect PCr due to precipitous delivery. Post partum BP normal   4. GDM: No meds. A1C 5.6. Failed 3hr GTT. Will need 6wk postpartum GTT. 5. Anemia: Hgb 10.2 prior to delivery. Continue Iron. 6. Fam hx of thyroid disease: TSH wnl.     Patient discussed with Dr. Paul Oseguera MD  Family Medicine Resident

## 2022-05-05 NOTE — DISCHARGE SUMMARY
Obstetrical Discharge Summary     Name: Tia Angel MRN: [id number]  SSN: [id number]    YOB: 1985  Age: 39 y.o. Sex: female      Admit Date: 2022    Discharge Date: 2022     Admitting Physician: Karole Skiff, MD     Attending Physician:  Le Aburto DO     Admission Diagnoses: Pregnancy [Z34.90]    Discharge Diagnoses:   Information for the patient's :  Jeny Lebron [[id number]]   Delivery of a 6 lb 1.2 oz (2.755 kg) female infant via Vaginal, Spontaneous on 2022 at 1:50 AM  by Karole Skiff. Apgars were 9  and 9 . Additional Diagnoses:   Hospital Problems  Date Reviewed: 2022          Codes Class Noted POA    Pregnancy ICD-10-CM: Z34.90  ICD-9-CM: V22.2  2022 Unknown             Lab Results   Component Value Date/Time    Rubella, External immune 2022 12:00 AM       Immunization(s):   Immunization History   Administered Date(s) Administered    Influenza Vaccine Prezacor) PF (>6 Mo Flulaval, Fluarix, and >3 Nellene Bring 94913) 2022    Tdap 2022        Hospital Course:   Patient is a 39 y.o. [id number] s/p  at 35 weeks 4 days. Presented for  Active Labor in setting of PPROM. Pregnancy complicated by elevated BP, GDM (no meds), anemia, late to care, AMA, obesity. Labor was precipitous. Delivered TLFI by  without complications. Normal hospital course following the delivery. On day of discharge patient reported minimal lochia, well controlled pain, and no other complaints. Discharged with pain regimen and bowel regimen. Advised to continue prenatal vitamins. Follow up with [facility] in 6 weeks.       Depression Scale: 6           Follow up test at discharge: None  Condition at Discharge:  Stable  Disposition: Discharge to Home    Physical exam:  Visit Vitals  /82   Pulse 77   Temp 98.2 °F (36.8 °C)   Resp 17   Ht 5' (1.524 m)   Wt 187 lb (84.8 kg)   LMP 2021   SpO2 96%   Breastfeeding Unknown   BMI 36.52 kg/m²       Exam:  Patient without distress. CTAB, no w/r/r/c               RRR, + S1 and S2, no m/r/g               Abdomen soft, fundus firm at level of umbilicus, non tender               Lower extremities are negative for swelling, cords or tenderness. Patient Instructions:   Current Discharge Medication List      START taking these medications    Details   ibuprofen (MOTRIN) 800 mg tablet Take 1 Tablet by mouth every eight (8) hours. Qty: 30 Tablet, Refills: 0  Start date: 5/5/2022      docusate sodium (COLACE) 100 mg capsule Take 1 Capsule by mouth two (2) times a day. Qty: 20 Capsule, Refills: 0  Start date: 5/5/2022         CONTINUE these medications which have NOT CHANGED    Details   ferrous sulfate (IRON) 325 mg (65 mg iron) EC tablet Take 1 Tablet by mouth every other day. Qty: 90 Tablet, Refills: 1    Associated Diagnoses: Anemia during pregnancy in third trimester      PNV Comb #2-Iron-FA-Omega 3 29-1-400 mg cmpk Take  by mouth. glucose blood VI test strips (ReliOn Prime Test Strips) strip Use as directed  Qty: 100 Strip, Refills: 1    Associated Diagnoses: Diet controlled gestational diabetes mellitus (GDM) in third trimester      lancets (ReliOn Ultra Thin Plus Lancets) misc Use as directed. Qty: 1 Each, Refills: 11      Blood-Glucose Meter (ReliOn Prime Meter) misc Please measure your blood sugar at least four times daily (fasting and 2 hours after eating). Goal <95 fasting and <120 two hours after eating. Use as directed. Qty: 1 Each, Refills: 0      aspirin delayed-release 81 mg tablet Take 1 Tablet by mouth daily. Qty: 90 Tablet, Refills: 1      acetaminophen (TYLENOL PO) Take  by mouth. Reference my discharge instructions.     Follow-up Information     Follow up With Specialties Details Why [address] [facility] [address]  On 6/15/2022 To follow up on your pregnancy at 8:40am with Dr. Yessi Kearns [address] [place] [address] Hans [address]  [phone]    Faraz Marquez MD Family Medicine On 5/19/2022 To follow up on your pregnancy via VIRTUAL VISIT at 1:00pm [address] [place] [address]  [phone]              Signed By:  David Flores MD    Family Medicine Resident

## 2022-05-06 VITALS
OXYGEN SATURATION: 96 % | WEIGHT: 187 LBS | DIASTOLIC BLOOD PRESSURE: 82 MMHG | SYSTOLIC BLOOD PRESSURE: 119 MMHG | TEMPERATURE: 98.2 F | RESPIRATION RATE: 17 BRPM | HEART RATE: 77 BPM | HEIGHT: 60 IN | BODY MASS INDEX: 36.71 KG/M2

## 2022-05-06 PROCEDURE — 74011250637 HC RX REV CODE- 250/637

## 2022-05-06 RX ADMIN — IBUPROFEN 800 MG: 800 TABLET, FILM COATED ORAL at 09:28

## 2022-05-06 RX ADMIN — Medication 1 TABLET: at 09:27

## 2022-05-06 RX ADMIN — FERROUS SULFATE TAB 325 MG (65 MG ELEMENTAL FE) 325 MG: 325 (65 FE) TAB at 09:27

## 2022-05-06 NOTE — DISCHARGE INSTRUCTIONS
Patient Education   Después del parto (período de posparto): Instrucciones de cuidado  After Your Delivery (the Postpartum Period): Care Instructions  Instrucciones de cuidado   Felicidades por el nacimiento de mtz bebé. Al igual que el Mya, el tiempo con el recién nacido puede ser un momento de [place], [place] y agotamiento. Es posible que se sienta lyons al mirar la goldie de mtz pequeño bebé. También podría sentirse abrumada por mtz nuevo ritmo de sueño y las nuevas responsabilidades. Al principio, los bebés suelen dormir perry el día y permanecen despiertos perry la noche. No tienen ningún patrón ni rutina. Podrían germaine gritos ahogados, sacudirse y despertarse, o parecer rory si tuvieran los ojos cruzados (bizcos). Todo esto es normal, e incluso la pueden hacer sonreír. Perry las primeras semanas siguientes al parto, trate de cuidarse loretta. Podría tardar de 4 a 6 semanas en volver a sentirse usted misma, y posiblemente más tiempo si le stein hecho hakeem cesárea. Es probable que se sienta muy fatigada perry varias semanas. Faith días estarán llenos de Dipesh, demetra también de mucha alegría. La atención de seguimiento es hakeem parte clave de mtz tratamiento y seguridad. Asegúrese de hacer y acudir a todas las citas, y llame a mtz médico si está teniendo problemas. También es hakeem buena idea saber los resultados de faith exámenes y mantener hakeem lista de los medicamentos que case. ¿Cómo puede cuidarse en el hogar? Cuide mtz cuerpo después del parto  · Utilice toallas sanitarias en vez de tampones para las pérdidas de eliane que podrían durar hasta 2 semanas. · Alivie los cólicos con ibuprofeno (Advil, Motrin). · Alivie el dolor de las hemorroides y la riley entre la vagina y el recto con compresas de hielo o de infusión de martha Nicole (\"witch hazel\"). · Alivie el estreñimiento bebiendo mucho líquido y comiendo alimentos ricos en fibra.  Pregúntele a mtz médico acerca de los ablandadores de heces de Kasey Cogan. · Límpiese con un chorrito suave de agua tibia de hakeem botella en vez de hacerlo con papel higiénico.  · [place] un baño de asiento en agua tibia varias veces al día. · Use un buen sostén de lactancia. Alivie el dolor y la hinchazón de los senos con toallitas de aseo húmedas tibias. · Si no está amamantando, utilice hielo en lugar de calor para aliviar el dolor de los senos. · Si está amamantando, day período menstrual podría no comenzar hasta después de varios meses. Es posible que, al principio, eliane más y [place] de lo que lo hacía antes del [place]. · Espere hasta que haya sanado (de 4 a 6 semanas) antes de volver a tener relaciones sexuales. Day médico le dirá cuándo puede tener relaciones sexuales. · Trate de no viajar con el bebé perry las primeras 5 o 6 semanas. Si hace un viaje terrance en automóvil, elen paradas frecuentes para caminar y estirarse. Evite el agotamiento  · Descanse todos los días. Trate de dormir la siesta cuando day bebé también lo elen. · Pídale a otro adulto que la acompañe por unos dinorah después del [place]. · Planifique el cuidado de los niños si tiene otros hijos. · Sea flexible para que pueda comer a horas fuera de lo común y dormir cuando lo necesite. Tanto usted rory day bebé están creando horarios nuevos. · Planee pequeñas salidas para estar fuera de casa. El cambio podría hacer que se sienta menos fatigada. · Pida ayuda para cocinar y [address] [place] hogar y las compras. Recuerde que day principal tarea consiste en cuidar a day bebé. Sepa qué ayuda puede recibir en areli de tener depresión posparto  · La depresión posparto es común perry las primeras 1 o 2 semanas siguientes al parto. Podría llorar o sentirse makenna o irritable sin razón alguna. · Descanse cada vez que pueda hacerlo. Estar fatigada dificulta manejar las emociones. · Salga a caminar con day bebé. · Hable con day arsalan, faith amigos y faith familiares acerca de faith sentimientos.   · Si faith síntomas cooper más de [employer], o si se siente muy deprimida, pídale ayuda a mtz médico.  · La depresión posparto puede tratarse. Los grupos de apoyo y la asesoría psicológica pueden ser Gerda Joshua. A veces, los medicamentos también pueden ayudar. Manténgase saludable  · Coma alimentos saludables para tener más energía y adelgazar las libras adicionales que engordó con el bebé. · Si amamanta, evite las drogas. Si dejó de fumar perry el embarazo, trate de no volver a fumar. Si opta por agustin hakeem bebida alcohólica de vez en cuando, solo tome hakeem bebida y limite la cantidad de ocasiones en que elsa alcohol. Después de beber alcohol, espere al menos 2 horas antes de amamantar para reducir la cantidad de alcohol que el bebé pueda recibir a través de la [place]. · Comience a hacer ejercicios diarios después de 4 a 6 semanas, demetra descanse cuando se sienta fatigada. · Aprenda ejercicios para tonificar el abdomen. Rocio ejercicios de Kegel para recuperar la fuerza de los músculos pélvicos. Puede hacer los ejercicios de Kegel mientras está de pie o sentada. ? Apriete los mismos músculos que usted usaría para detener la [place]. El abdomen y los muslos no deben moverse. ? Manténgalos apretados perry 3 segundos y, luego, relájelos otros 3 segundos. ? Empiece con 3 segundos. Deloise [place], añada 1 rayne cada semana hasta que sea capaz de apretar perry 10 segundos. ? Repita el ejercicio entre 10 y 13 veces Aneta Braun [place] o más sesiones cada día. · Busque hakeem clase para nuevas madres y recién nacidos que tenga un tiempo de ejercicio. · Si le stein hecho hakeem cesárea, dese un poco más de tiempo antes de hacer ejercicio, y tenga cuidado. ¿Cuándo debe pedir ayuda? Llame al 911  en cualquier momento que considere que necesita atención de [place].  Por ejemplo, llame si:    · Tiene pensamientos de lastimarse o de hacerle daño a mtz bebé o a otra persona.     · Se desmayó (perdió el conocimiento).     · Tiene dolor en el pecho, le falta el aire o tose eliane.     · Tiene convulsiones. Llame a mtz médico ahora mismo o busque atención médica de inmediato si:    · Tiene sangrado vaginal intenso. [place] significa que está expulsando coágulos sanguíneos y empapando hakeem toalla sanitaria cada hora por 2 horas o más.     · Está mareada o aturdida, o siente rory que se puede desmayar.     · Tiene fiebre.     · Tiene dolor abdominal nuevo o más intenso.     · Tiene señales de un coágulo de eliane en la pierna (que se llama trombosis venosa profunda), rory:  ? Dolor en la pantorrilla, el muslo, la robert o detrás de la rodilla. ? Enrojecimiento e hinchazón en la pierna o la robert.     · Tiene señales de preeclampsia, rory:  ? Hinchazón repentina de la gómez, las enrique o los pies. ? Nuevos problemas de la vista (rory oscurecimiento, abby borroso o abby puntos). ? Dolor de felecia intenso. Preste especial atención a los cambios en mtz juan antonio y asegúrese de comunicarse con mtz médico si:    · Mtz sangrado vaginal parece volverse más intenso.     · Tiene flujo vaginal nuevo o que empeora.     · Se siente makenna, ansiosa o sin esperanzas perry más de unos pocos días.     · No mejora rory se esperaba. ¿Dónde puede encontrar más información en inglés? Jose stallings [email or web address]://www.carter.com/  Juhi T868 en la búsqueda para aprender más acerca de \"Después del parto (período de posparto): Instrucciones de cuidado. \"  Revisado: 16 junio, 2021               Versión del contenido: 13.2  © 1232-9822 Healthwise, Incorporated. Las instrucciones de cuidado fueron adaptadas bajo licencia por [facility] (which disclaims liability or warranty for this information). Si usted tiene [place] afección médica o sobre estas instrucciones, siempre pregunte a mtz profesional de juan antonio. [employer] niega toda garantía o responsabilidad por mtz uso de esta información.          Patient Discharge Instructions    Asad Villeda Sneha Romero / [id number] : 1985    Admitted 2022 Discharged: 2022       Por favor tenga fernando documento presente en mtz jacky de seguimiento con mtz médico primario. Primary care provider:  Duncan Lopez MD    Discharging provider:  Ziyad Ruff MD  - Family Medicine Resident  Elvira Lema MD -  OBGYN attending          [address]:  Pregnancy [T68.59]      [address]:     Follow-up Information     Follow up With Specialties Details Why [address] [place] [address],[address] [place] [address]  On 6/15/2022 To follow up on your pregnancy at 8:40am with Dr. Christina Dent [place] [address]  [phone]    Cindy Tolentino MD Family Medicine On 2022 To follow up on your pregnancy via VIRTUAL VISIT at 1:00pm [address]  [phone]            Continue Tratamiento:  - Por favor continue Motrin 800 mg, 1 tableta cada 8 horas por los próximos 2 días, luego 1 tableta cada 8 horas mientras sea necesario para el dolor.  - Por favor continue Colace 100 mg para el estreñimiento, tome 1 tableta dos veces al día hasta que pueda defercar regularmente sin necesidad del medicamento. - Por favor continue tomando faith vitaminas prenatales. Pruebas de seguimiento que necesita: Ning[place]    Resultados pendientes:   En el momento de mtz de ruma los [place] de las siguientes pruebas están pendiente:  [place]. Por favor discuta estos resultados con mtz proveedor primario en mtz jacky de seguimiento. Importante que [employer] siguientes síntomas: dolor de [place], falta de [place], [place], escalofríos, náusea, vómito, diarrea, cambios en mtz estado mental, caídas, debilidad y sangrado. DIETA/que comer:  Regular    ACTIVIDAD FÍSICA:   Instrucciones de [facility] Física    No levante nada que sea más pesado que mtz bebé por las próximas 6 semanas. No insertar nada por la vaginal por 6 semanas.  Luego del parto por cesárea/ vaginal debe evitar tener relaciones sexuales por 6 semanas. Tendrá mtz jacky de seguimiento posparto a las 6 semanas. Puede manejar mtz vehículo mientras no esté tomando Percocet ni ningún medicamento nárcotico (Motrin está loretta). Cuidado de [place]:  llene el justo bottle con agua tibia y exprima hasta que salga un chorro de agua por la boquilla para ayudarle a limpiar el área perineal.      Entiendo que si surge algún problema cuando llegue a mi hogar puedo contactar a mi médico.    Me stein explicado las siguientes instrucciones y stein aclarado mis dudas. Entiendo y reconozco las instrucciones brindadas.                                                                                                                                                Firma de Arva Reins / Carias Ano                                                                 Fecha/Hora                                                                                                                                              Firma de paciente ó representante                                                         Fecha/Hora

## 2022-05-06 NOTE — PROGRESS NOTES
Patient discharged to home. Education completed. Patient reported she had no more questions. Bands verified on patient and infant, see footprint sheet. Infant placed in carseat by parent.   Prescriptions: motrin and colace

## 2022-06-10 ENCOUNTER — TELEPHONE (OUTPATIENT)
Dept: FAMILY MEDICINE CLINIC | Age: 37
End: 2022-06-10

## 2022-06-10 NOTE — TELEPHONE ENCOUNTER
: [id number]    This writer attempted to return call to patient regarding a missed call with  on the call. Unable to reach patient voicemail left regarding reason of call and upcoming appointment date and time.

## 2022-06-10 NOTE — TELEPHONE ENCOUNTER
----- Message from Mirza Solorzano sent at 6/9/2022  3:26 PM EDT -----  Subject: Message to Provider    QUESTIONS  Information for Provider? Patient returning a call from the office that   she missed and would like a call back as soon as possible  ---------------------------------------------------------------------------  --------------  [address]  What is the best way for the office to contact you? OK to leave message on   voicemail  Preferred Call Back Phone Number?  [phone]  ---------------------------------------------------------------------------  --------------  SCRIPT ANSWERS  undefined

## 2025-03-28 NOTE — PROGRESS NOTES
Spoke to pt and confirmed  a surgery date 8/15/25 at the  [facility], [facility] floor surgery center- Pt agreeable. Provided patient with details of pre /post op appts, basic prep for sx, arrival time the day before, triage call from anesthesia dept, and address of the location.  call back # to RN navigator given should any questions or concerns arise  All questions and concerns addressed. Pt voiced understanding.   Return OB Visit     Subjective:   Isma Schumacher is a 39 y.o.  at 35w2d by LMP  Estimated Date of Delivery: 22    LOF: No  Fetal movement: Yes  Contractions: No  Headaches, blurred vision, RUQ pain: No  Taking prenatal vitamins: Yes    Concerns today: small bleeding yesterday and this morning. Mixed with small mucus discharge. No longer having any bleeding. No recent sexual intercourse. .    Allergies   No Known Allergies  Medications:   Current Outpatient Medications   Medication Sig    glucose blood VI test strips (ReliOn Prime Test Strips) strip Use as directed    lancets (ReliOn Ultra Thin Plus Lancets) misc Use as directed.  Blood-Glucose Meter (ReliOn Prime Meter) misc Please measure your blood sugar at least four times daily (fasting and 2 hours after eating). Goal <95 fasting and <120 two hours after eating. Use as directed.  ferrous sulfate (IRON) 325 mg (65 mg iron) EC tablet Take 1 Tablet by mouth every other day.  aspirin delayed-release 81 mg tablet Take 1 Tablet by mouth daily.  PNV Comb #2-Iron-FA-Omega 3 29-1-400 mg cmpk Take  by mouth. (Patient not taking: Reported on 2022)    acetaminophen (TYLENOL PO) Take  by mouth. (Patient not taking: Reported on 2022)     No current facility-administered medications for this visit. Past Medical History:  No past medical history on file. Past Surgical History:   No past surgical history on file. Social History:  Social History     Tobacco Use    Smoking status: Never Smoker    Smokeless tobacco: Never Used   Substance Use Topics    Alcohol use:  Yes    Drug use: Not on file     Immunizations:   Immunization History   Administered Date(s) Administered    Influenza Vaccine Beamr) PF (>6 Mo Flulaval, Fluarix, and >3 Yrs Afluria, Fluzone 42779) 2022    Tdap 2022     Objective     Visit Vitals  /69 (BP 1 Location: Right arm, BP Patient Position: Sitting, BP Cuff Size: Adult)   Pulse 91   Temp 98.1 °F (36.7 °C) (Oral)   Resp 17   Ht 5' (1.524 m)   Wt 187 lb (84.8 kg)   LMP 2021   SpO2 98%   BMI 36.52 kg/m²       Physical Exam  GENERAL APPEARANCE: alert, well appearing, in no apparent distress  ABDOMEN: gravid, fundal height 37 cm, FHT present at 145 bpm  PSYCH: normal mood and affect    Labs  No results found for this or any previous visit (from the past 12 hour(s)). Hawa Ocampo is a 39 y.o.  at 35w2d by LMP here for a return OB visit. Estimated Date of Delivery: 22   Plan     IUP: B+, Ab neg, Hgb fractionation normal, HIV/G/C/RPR/Hep B/Hep C neg, Rub/VZV immune, Ucx neg, Pap NILM, Failed 1hr and 3hr GTT  -Panorama low risk  - S/p Flu and Tdap  - U/S () @ 23w0d: Normal anatomy, DAVIE 823 g 44th%,   - U/S (3/7) @ 27w2d: Normal anatomy,  g 47th%, suboptimal anatomy from last time well visualized. - US () @ 32w3d: EFW: 45th%. Fetal anatomy appears normal.  - MFM appointment on  at 2pm for  surveillance and fetal growth. - Continue taking prenatal vitamins daily     Gestational Diabetes: Failed 3 hr GTT. A1C 5.6. Some lunch and dinner values from previous week were not within normal range. Recent BG values were more improved with about 4 values outside the normal range. - Educated patient on improving diet. - Follow up in 1 week with glucose log. Bloody show: concern as mentioned above, likely due to bloody show. Resolved by the time patient was evaluated in the clinic.   - Given strict ED precaution     Anemia: Hgb 10.4 on 3/23/22  -Continue oral iron     Urinary frequency: UCx 50k staph aureus on 3/23/22     Late to care:  Pt presented at 20 weeks.   - Continue with PNV     Advance maternal age:   - Continue ASA 81 mg daily     Obesity in pregnancy:  - Recommend exercise in pregnancy at least 3 or more times weekly, mild to moderate intensity.  Avoid activities that cause abdominal trauma.    - Discussed appropriate diet during pregnancy, foods to avoid and stressed importance of hydration      Family hx of thyroid disease: TSH wnl. Follow up: 05/11/2022 (pt aware)     Other  ? Continuity Provider: None  ? Pain mgmt. in labor: TBD  ? Feeding: TBD  ? Circ: TBD  ? Social: TBD       Labor precautions discussed, including: Regular painful contractions, lasting for greater than one hour, taking your breath away; any vaginal bleeding; any leakage of fluid; or absent or decreased fetal movement. Call M.D. on call if any of these symptoms or signs occur. I have discussed the diagnosis with the patient and the intended plan as seen in the above orders. The patient has received an after-visit summary and questions were answered concerning future plans. I have discussed medication side effects and warnings with the patient as well. Informed pt to return to the office or go to the ER if she experiences vaginal bleeding, vaginal discharge, leaking of fluid, pelvic cramping.     Patient discussed with Dr. Cara Gruber (Attending Physician)    Darrell Vogel MD  Family Medicine Resident